# Patient Record
Sex: FEMALE | Race: WHITE | NOT HISPANIC OR LATINO | Employment: UNEMPLOYED | URBAN - METROPOLITAN AREA
[De-identification: names, ages, dates, MRNs, and addresses within clinical notes are randomized per-mention and may not be internally consistent; named-entity substitution may affect disease eponyms.]

---

## 2018-04-06 ENCOUNTER — APPOINTMENT (OUTPATIENT)
Dept: RADIOLOGY | Facility: MEDICAL CENTER | Age: 28
End: 2018-04-06
Attending: EMERGENCY MEDICINE
Payer: COMMERCIAL

## 2018-04-06 ENCOUNTER — HOSPITAL ENCOUNTER (EMERGENCY)
Facility: MEDICAL CENTER | Age: 28
End: 2018-04-06
Attending: EMERGENCY MEDICINE
Payer: COMMERCIAL

## 2018-04-06 VITALS
DIASTOLIC BLOOD PRESSURE: 79 MMHG | WEIGHT: 115.74 LBS | TEMPERATURE: 97.9 F | SYSTOLIC BLOOD PRESSURE: 115 MMHG | BODY MASS INDEX: 21.3 KG/M2 | OXYGEN SATURATION: 100 % | HEIGHT: 62 IN | HEART RATE: 74 BPM | RESPIRATION RATE: 16 BRPM

## 2018-04-06 DIAGNOSIS — O26.899 PELVIC PAIN IN PREGNANCY: ICD-10-CM

## 2018-04-06 DIAGNOSIS — R10.2 PELVIC PAIN IN PREGNANCY: ICD-10-CM

## 2018-04-06 LAB
ALBUMIN SERPL BCP-MCNC: 4.7 G/DL (ref 3.2–4.9)
ALBUMIN/GLOB SERPL: 1.7 G/DL
ALP SERPL-CCNC: 43 U/L (ref 30–99)
ALT SERPL-CCNC: 13 U/L (ref 2–50)
ANION GAP SERPL CALC-SCNC: 9 MMOL/L (ref 0–11.9)
APPEARANCE UR: CLEAR
APPEARANCE UR: CLEAR
AST SERPL-CCNC: 16 U/L (ref 12–45)
B-HCG SERPL-ACNC: 7278.2 MIU/ML (ref 0–5)
BASOPHILS # BLD AUTO: 0.9 % (ref 0–1.8)
BASOPHILS # BLD: 0.09 K/UL (ref 0–0.12)
BILIRUB SERPL-MCNC: 0.4 MG/DL (ref 0.1–1.5)
BUN SERPL-MCNC: 13 MG/DL (ref 8–22)
CALCIUM SERPL-MCNC: 9.8 MG/DL (ref 8.5–10.5)
CHLORIDE SERPL-SCNC: 104 MMOL/L (ref 96–112)
CO2 SERPL-SCNC: 24 MMOL/L (ref 20–33)
COLOR UR AUTO: YELLOW
COLOR UR AUTO: YELLOW
CREAT SERPL-MCNC: 0.93 MG/DL (ref 0.5–1.4)
EOSINOPHIL # BLD AUTO: 0.01 K/UL (ref 0–0.51)
EOSINOPHIL NFR BLD: 0.1 % (ref 0–6.9)
ERYTHROCYTE [DISTWIDTH] IN BLOOD BY AUTOMATED COUNT: 42.2 FL (ref 35.9–50)
GLOBULIN SER CALC-MCNC: 2.8 G/DL (ref 1.9–3.5)
GLUCOSE SERPL-MCNC: 83 MG/DL (ref 65–99)
GLUCOSE UR QL STRIP.AUTO: NEGATIVE MG/DL
GLUCOSE UR STRIP.AUTO-MCNC: NORMAL MG/DL
HCG UR QL: POSITIVE
HCT VFR BLD AUTO: 47.6 % (ref 37–47)
HGB BLD-MCNC: 16.6 G/DL (ref 12–16)
IMM GRANULOCYTES # BLD AUTO: 0.03 K/UL (ref 0–0.11)
IMM GRANULOCYTES NFR BLD AUTO: 0.3 % (ref 0–0.9)
KETONES UR QL STRIP.AUTO: NEGATIVE MG/DL
KETONES UR STRIP.AUTO-MCNC: NORMAL MG/DL
LEUKOCYTE ESTERASE UR QL STRIP.AUTO: NEGATIVE
LEUKOCYTE ESTERASE UR QL STRIP.AUTO: NORMAL
LIPASE SERPL-CCNC: 27 U/L (ref 11–82)
LYMPHOCYTES # BLD AUTO: 2.85 K/UL (ref 1–4.8)
LYMPHOCYTES NFR BLD: 27.6 % (ref 22–41)
MCH RBC QN AUTO: 31.6 PG (ref 27–33)
MCHC RBC AUTO-ENTMCNC: 34.9 G/DL (ref 33.6–35)
MCV RBC AUTO: 90.7 FL (ref 81.4–97.8)
MONOCYTES # BLD AUTO: 0.76 K/UL (ref 0–0.85)
MONOCYTES NFR BLD AUTO: 7.4 % (ref 0–13.4)
NEUTROPHILS # BLD AUTO: 6.6 K/UL (ref 2–7.15)
NEUTROPHILS NFR BLD: 63.7 % (ref 44–72)
NITRITE UR QL STRIP.AUTO: NEGATIVE
NITRITE UR QL STRIP.AUTO: NORMAL
NRBC # BLD AUTO: 0 K/UL
NRBC BLD-RTO: 0 /100 WBC
PH UR STRIP.AUTO: 6 [PH]
PH UR STRIP.AUTO: 6 [PH] (ref 5–8)
PLATELET # BLD AUTO: 222 K/UL (ref 164–446)
PMV BLD AUTO: 9.9 FL (ref 9–12.9)
POTASSIUM SERPL-SCNC: 3.7 MMOL/L (ref 3.6–5.5)
PROT SERPL-MCNC: 7.5 G/DL (ref 6–8.2)
PROT UR QL STRIP: NEGATIVE MG/DL
PROT UR QL STRIP: NORMAL MG/DL
RBC # BLD AUTO: 5.25 M/UL (ref 4.2–5.4)
RBC UR QL AUTO: ABNORMAL
RBC UR QL AUTO: NORMAL
SODIUM SERPL-SCNC: 137 MMOL/L (ref 135–145)
SP GR UR STRIP.AUTO: 1.02
SP GR UR: 1.02
WBC # BLD AUTO: 10.3 K/UL (ref 4.8–10.8)

## 2018-04-06 PROCEDURE — A9270 NON-COVERED ITEM OR SERVICE: HCPCS | Performed by: EMERGENCY MEDICINE

## 2018-04-06 PROCEDURE — 81002 URINALYSIS NONAUTO W/O SCOPE: CPT | Performed by: EMERGENCY MEDICINE

## 2018-04-06 PROCEDURE — 81025 URINE PREGNANCY TEST: CPT

## 2018-04-06 PROCEDURE — 84702 CHORIONIC GONADOTROPIN TEST: CPT

## 2018-04-06 PROCEDURE — 80053 COMPREHEN METABOLIC PANEL: CPT

## 2018-04-06 PROCEDURE — 99284 EMERGENCY DEPT VISIT MOD MDM: CPT

## 2018-04-06 PROCEDURE — 83690 ASSAY OF LIPASE: CPT

## 2018-04-06 PROCEDURE — 81002 URINALYSIS NONAUTO W/O SCOPE: CPT

## 2018-04-06 PROCEDURE — 700102 HCHG RX REV CODE 250 W/ 637 OVERRIDE(OP): Performed by: EMERGENCY MEDICINE

## 2018-04-06 PROCEDURE — 85025 COMPLETE CBC W/AUTO DIFF WBC: CPT

## 2018-04-06 PROCEDURE — 76817 TRANSVAGINAL US OBSTETRIC: CPT

## 2018-04-06 RX ORDER — ACETAMINOPHEN 325 MG/1
650 TABLET ORAL ONCE
Status: COMPLETED | OUTPATIENT
Start: 2018-04-06 | End: 2018-04-06

## 2018-04-06 RX ADMIN — ACETAMINOPHEN 650 MG: 325 TABLET, FILM COATED ORAL at 21:04

## 2018-04-06 ASSESSMENT — PAIN SCALES - GENERAL
PAINLEVEL_OUTOF10: 0
PAINLEVEL_OUTOF10: 5
PAINLEVEL_OUTOF10: 0

## 2018-04-07 NOTE — ED NOTES
D/c instructions reviewed with pt. Pt in nad. resp equal and unlabored. Pt verbalized understanding. Pt ambulatory out of department without difficulty. Pt has ob appt the end of this month.

## 2018-04-07 NOTE — ED PROVIDER NOTES
"ED Provider Note    CHIEF COMPLAINT  Chief Complaint   Patient presents with   • Pregnancy     Pt. states she is approx 5 weeks and 5 days pregnant.   • Cramping     Pt. states generalized abdominal cramping and would like to get checked out to make sure everything is okay with her pregnancy. Pt. denies any vaginal bleeding at this time.       HPI  Osiris Oates is a 27 y.o. female who presents with a report that she's got some generalized abdominal cramping that is lower quadrant in location mostly left side and is concerned about her pregnancy. Denies any vaginal bleeding or discharge but thinks there may be some wrong with her pregnancy. Denies any fever, chills, sweats, dysuria or frequency    REVIEW OF SYSTEMS  See HPI for further details. All other systems are negative.     PAST MEDICAL HISTORY  Past Medical History:   Diagnosis Date   • BELLE positive    • Anxiety    • Endometriosis    • Endometriosis    • Gastroparesis    • Interstitial cystitis    • Seizure disorder (CMS-HCC)        FAMILY HISTORY  Family History   Problem Relation Age of Onset   • Other Mother      Wilsons Disease       SOCIAL HISTORY   reports that she has been smoking Cigarettes.  She has smoked for the past 3.00 years. She has never used smokeless tobacco. She reports that she does not drink alcohol or use drugs.    SURGICAL HISTORY  Past Surgical History:   Procedure Laterality Date   • APPENDECTOMY     • LAPAROSCOPIC DESTRUCTION OF ENDOMETRIOSIS     • OTHER ABDOMINAL SURGERY     • TONSILLECTOMY         CURRENT MEDICATIONS  Home Medications    **Home medications have not yet been reviewed for this encounter**         ALLERGIES  Allergies   Allergen Reactions   • Codeine      Gives nausea   • Tramadol Anaphylaxis   • Vicodin [Hydrocodone-Acetaminophen]        PHYSICAL EXAM  VITAL SIGNS: /75   Pulse 81   Temp 36.5 °C (97.7 °F)   Resp 16   Ht 1.575 m (5' 2\")   Wt 52.5 kg (115 lb 11.9 oz)   LMP 01/04/2018   SpO2 100%   " BMI 21.17 kg/m²    Constitutional: Well developed, Well nourished, No acute distress, Non-toxic appearance.   HENT: Normocephalic, Atraumatic, Bilateral external ears normal, Oropharynx is clear mucous membranes are moist. No oral exudates or nasal discharge.   Eyes: Pupils are equal round and reactive, EOMI, Conjunctiva normal, No discharge.   Neck: Normal range of motion, No tenderness, Supple, No stridor. No meningismus.  Lymphatic: No lymphadenopathy noted.   Cardiovascular: Regular rate and rhythm without murmur rub or gallop.  Thorax & Lungs: Clear breath sounds bilaterally without wheezes, rhonchi or rales. There is no chest wall tenderness.   Abdomen: Soft with left sided pelvic tenderness that is periovarian, the abdomen is scaphoid and otherwise non-distended. There is no rebound or guarding. No organomegaly is appreciated. Bowel sounds are normal.  Skin: Normal without rash.   Back: No CVA or spinal tenderness.   Extremities: Intact distal pulses, No edema, No tenderness, No cyanosis, No clubbing. Capillary refill is less than 2 seconds.  Musculoskeletal: Good range of motion in all major joints. No tenderness to palpation or major deformities noted.   Neurologic: Alert & oriented x 3, Normal motor function, Normal sensory function, No focal deficits noted. Reflexes are normal.  Psychiatric: Affect normal, Judgment normal, Mood normal. There is no suicidal ideation or patient reported hallucinations.     RADIOLOGY/PROCEDURES  US-OB PELVIS TRANSVAGINAL   Final Result         1.  Early intrauterine pregnancy at calculated at 5 weeks 5 days gestation for estimated date of delivery December 2, 2018. Fetal pole is not identified, cannot evaluate for viability.   2.  Solid and cystic-appearing left ovarian masslike structure. Could represent atypical appearing corpus luteal cyst. Complex soft tissue mass not excluded. Recommend follow-up pelvic sonogram in 6 weeks for reevaluation.            COURSE & MEDICAL  DECISION MAKING  Pertinent Labs & Imaging studies reviewed. (See chart for details)  Time had concerns of possible ectopic pregnancy in this young woman who has a 1st pregnancy that is causing her some pain on the left side.    Beta-hCG is 7278 and therefore intrauterine pregnancy should be able to be identified. There is no leukocytosis, shift, anemia or electrolyte derangements on laboratory evaluation    Ultrasound shows evidence of early intrauterine gestation that is almost 6 weeks. She needs follow-up ultrasound in another 6 weeks to determine true viability. She is understanding her need for follow-up and repeat ultrasound and her need for prenatal care. She is discharged with instructions to take Tylenol for discomfort return of any heavy vaginal bleeding that she is concerned about    FINAL IMPRESSION  1. Pelvic pain in pregnancy             Electronically signed by: Eduard Foster, 4/6/2018 10:48 PM

## 2018-04-07 NOTE — ED TRIAGE NOTES
"Osiris Jennifer Иван  27 y.o. female  Chief Complaint   Patient presents with   • Pregnancy     Pt. states she is approx 5 weeks and 5 days pregnant.   • Cramping     Pt. states generalized abdominal cramping and would like to get checked out to make sure everything is okay with her pregnancy. Pt. denies any vaginal bleeding at this time.     /75   Pulse 81   Temp 36.5 °C (97.7 °F)   Resp 16   Ht 1.575 m (5' 2\")   Wt 52.5 kg (115 lb 11.9 oz)   SpO2 100%   BMI 21.17 kg/m²     Pt amb to triage with steady gait for above complaint.   Pt is alert and oriented, speaking in full sentences, follows commands and responds appropriately to questions. NAD. Resp are even and unlabored.  Pt placed in lobby. Pt educated on triage process. Pt encouraged to alert staff for any changes.  "

## 2018-04-07 NOTE — ED NOTES
"Pt c/o pelvic pain and lower abd cramping, pain worse with movement. Pt reports + pregnancy test at home, estimating she is approx 5-6 weeks pregnant. Pt has not seen ob, reports she cannot get appt until the end of the month. Pt denies any vaginal bleeding or discharge. Pt reports this is her first pregnancy. Pt reports \"i just want to get everything checked out\".   "

## 2018-04-07 NOTE — DISCHARGE INSTRUCTIONS
Pelvic Pain, Female  Female pelvic pain can be caused by many different things and start from a variety of places. Pelvic pain refers to pain that is located in the lower half of the abdomen and between your hips. The pain may occur over a short period of time (acute) or may be reoccurring (chronic). The cause of pelvic pain may be related to disorders affecting the female reproductive organs (gynecologic), but it may also be related to the bladder, kidney stones, an intestinal complication, or muscle or skeletal problems. Getting help right away for pelvic pain is important, especially if there has been severe, sharp, or a sudden onset of unusual pain. It is also important to get help right away because some types of pelvic pain can be life threatening.   CAUSES   Below are only some of the causes of pelvic pain. The causes of pelvic pain can be in one of several categories.   · Gynecologic.  ¨ Pelvic inflammatory disease.  ¨ Sexually transmitted infection.  ¨ Ovarian cyst or a twisted ovarian ligament (ovarian torsion).  ¨ Uterine lining that grows outside the uterus (endometriosis).  ¨ Fibroids, cysts, or tumors.  ¨ Ovulation.  · Pregnancy.  ¨ Pregnancy that occurs outside the uterus (ectopic pregnancy).  ¨ Miscarriage.  ¨ Labor.  ¨ Abruption of the placenta or ruptured uterus.  · Infection.  ¨ Uterine infection (endometritis).  ¨ Bladder infection.  ¨ Diverticulitis.  ¨ Miscarriage related to a uterine infection (septic ).  · Bladder.  ¨ Inflammation of the bladder (cystitis).  ¨ Kidney stone(s).  · Gastrointestinal.  ¨ Constipation.  ¨ Diverticulitis.  · Neurologic.  ¨ Trauma.  ¨ Feeling pelvic pain because of mental or emotional causes (psychosomatic).  · Cancers of the bowel or pelvis.  EVALUATION   Your caregiver will want to take a careful history of your concerns. This includes recent changes in your health, a careful gynecologic history of your periods (menses), and a sexual history. Obtaining  your family history and medical history is also important. Your caregiver may suggest a pelvic exam. A pelvic exam will help identify the location and severity of the pain. It also helps in the evaluation of which organ system may be involved. In order to identify the cause of the pelvic pain and be properly treated, your caregiver may order tests. These tests may include:   · A pregnancy test.  · Pelvic ultrasonography.  · An X-ray exam of the abdomen.  · A urinalysis or evaluation of vaginal discharge.  · Blood tests.  HOME CARE INSTRUCTIONS   · Only take over-the-counter or prescription medicines for pain, discomfort, or fever as directed by your caregiver.    · Rest as directed by your caregiver.    · Eat a balanced diet.    · Drink enough fluids to make your urine clear or pale yellow, or as directed.    · Avoid sexual intercourse if it causes pain.    · Apply warm or cold compresses to the lower abdomen depending on which one helps the pain.    · Avoid stressful situations.    · Keep a journal of your pelvic pain. Write down when it started, where the pain is located, and if there are things that seem to be associated with the pain, such as food or your menstrual cycle.  · Follow up with your caregiver as directed.    SEEK MEDICAL CARE IF:  · Your medicine does not help your pain.  · You have abnormal vaginal discharge.  SEEK IMMEDIATE MEDICAL CARE IF:   · You have heavy bleeding from the vagina.    · Your pelvic pain increases.    · You feel light-headed or faint.    · You have chills.    · You have pain with urination or blood in your urine.    · You have uncontrolled diarrhea or vomiting.    · You have a fever or persistent symptoms for more than 3 days.  · You have a fever and your symptoms suddenly get worse.    · You are being physically or sexually abused.  This information is not intended to replace advice given to you by your health care provider. Make sure you discuss any questions you have with your  health care provider.  Document Released: 11/14/2005 Document Revised: 09/07/2016 Document Reviewed: 10/07/2016  Elsevier Interactive Patient Education © 2017 [employer]

## 2018-04-07 NOTE — ED NOTES
Pt updated with plan of care, awaiting us and lab results. Pt verbalized understanding. Pt laughing in bed with boyfriend, pt in nad. Will continue to monitor.

## 2018-04-29 ENCOUNTER — HOSPITAL ENCOUNTER (EMERGENCY)
Facility: MEDICAL CENTER | Age: 28
End: 2018-04-29
Attending: EMERGENCY MEDICINE
Payer: COMMERCIAL

## 2018-04-29 VITALS
SYSTOLIC BLOOD PRESSURE: 103 MMHG | RESPIRATION RATE: 18 BRPM | HEIGHT: 62 IN | BODY MASS INDEX: 21.58 KG/M2 | HEART RATE: 90 BPM | OXYGEN SATURATION: 100 % | WEIGHT: 117.28 LBS | DIASTOLIC BLOOD PRESSURE: 69 MMHG | TEMPERATURE: 97.2 F

## 2018-04-29 DIAGNOSIS — A08.4 VIRAL GASTROENTERITIS: ICD-10-CM

## 2018-04-29 PROCEDURE — 99284 EMERGENCY DEPT VISIT MOD MDM: CPT

## 2018-04-29 RX ORDER — ONDANSETRON 4 MG/1
4 TABLET, ORALLY DISINTEGRATING ORAL EVERY 6 HOURS PRN
Qty: 10 TAB | Refills: 0 | Status: SHIPPED | OUTPATIENT
Start: 2018-04-29 | End: 2018-09-30

## 2018-04-29 ASSESSMENT — PAIN SCALES - GENERAL: PAINLEVEL_OUTOF10: 7

## 2018-04-29 ASSESSMENT — LIFESTYLE VARIABLES: DO YOU DRINK ALCOHOL: NO

## 2018-04-29 NOTE — ED NOTES
Pt given discharge instructions and prescriptions.  All questions answered.  Pt discharged home with s/o.

## 2018-04-29 NOTE — ED TRIAGE NOTES
".  Chief Complaint   Patient presents with   • Diarrhea     Dark red diarrhea with mucus since yesterday   • N/V     x 1 episode of vomiting last night, denies blood in vomit     ./72   Pulse 92   Temp 36.2 °C (97.2 °F)   Resp 18   Ht 1.575 m (5' 2\")   Wt 53.2 kg (117 lb 4.6 oz)   LMP 01/04/2018   SpO2 100%   BMI 21.45 kg/m²     Ambulatory to triage with above complaints, patient is 9 weeks pregnant, G1, educated on triage process, placed in lobby with significant other, told to inform staff of any changes in condition.    "

## 2018-04-29 NOTE — DISCHARGE INSTRUCTIONS
Food Choices to Help Relieve Diarrhea, Adult  When you have diarrhea, the foods you eat and your eating habits are very important. Choosing the right foods and drinks can help relieve diarrhea. Also, because diarrhea can last up to 7 days, you need to replace lost fluids and electrolytes (such as sodium, potassium, and chloride) in order to help prevent dehydration.   WHAT GENERAL GUIDELINES DO I NEED TO FOLLOW?  · Slowly drink 1 cup (8 oz) of fluid for each episode of diarrhea. If you are getting enough fluid, your urine will be clear or pale yellow.  · Eat starchy foods. Some good choices include white rice, white toast, pasta, low-fiber cereal, baked potatoes (without the skin), saltine crackers, and bagels.  · Avoid large servings of any cooked vegetables.  · Limit fruit to two servings per day. A serving is ½ cup or 1 small piece.  · Choose foods with less than 2 g of fiber per serving.  · Limit fats to less than 8 tsp (38 g) per day.  · Avoid fried foods.  · Eat foods that have probiotics in them. Probiotics can be found in certain dairy products.  · Avoid foods and beverages that may increase the speed at which food moves through the stomach and intestines (gastrointestinal tract). Things to avoid include:  ¨ High-fiber foods, such as dried fruit, raw fruits and vegetables, nuts, seeds, and whole grain foods.  ¨ Spicy foods and high-fat foods.  ¨ Foods and beverages sweetened with high-fructose corn syrup, honey, or sugar alcohols such as xylitol, sorbitol, and mannitol.  WHAT FOODS ARE RECOMMENDED?  Grains  White rice. White, Indian, or myah breads (fresh or toasted), including plain rolls, buns, or bagels. White pasta. Saltine, soda, or alli crackers. Pretzels. Low-fiber cereal. Cooked cereals made with water (such as cornmeal, farina, or cream cereals). Plain muffins. Matzo. Velma toast. Zwieback.   Vegetables  Potatoes (without the skin). Strained tomato and vegetable juices. Most well-cooked and canned  vegetables without seeds. Tender lettuce.  Fruits  Cooked or canned applesauce, apricots, cherries, fruit cocktail, grapefruit, peaches, pears, or plums. Fresh bananas, apples without skin, cherries, grapes, cantaloupe, grapefruit, peaches, oranges, or plums.   Meat and Other Protein Products  Baked or boiled chicken. Eggs. Tofu. Fish. Seafood. Smooth peanut butter. Ground or well-cooked tender beef, ham, veal, lamb, pork, or poultry.   Dairy  Plain yogurt, kefir, and unsweetened liquid yogurt. Lactose-free milk, buttermilk, or soy milk. Plain hard cheese.  Beverages  Sport drinks. Clear broths. Diluted fruit juices (except prune). Regular, caffeine-free sodas such as ginger ale. Water. Decaffeinated teas. Oral rehydration solutions. Sugar-free beverages not sweetened with sugar alcohols.  Other  Bouillon, broth, or soups made from recommended foods.   The items listed above may not be a complete list of recommended foods or beverages. Contact your dietitian for more options.  WHAT FOODS ARE NOT RECOMMENDED?  Grains  Whole grain, whole wheat, bran, or rye breads, rolls, pastas, crackers, and cereals. Wild or brown rice. Cereals that contain more than 2 g of fiber per serving. Corn tortillas or taco shells. Cooked or dry oatmeal. Granola. Popcorn.  Vegetables  Raw vegetables. Cabbage, broccoli, [place] sprouts, artichokes, baked beans, beet greens, corn, kale, legumes, peas, sweet potatoes, and yams. Potato skins. Cooked spinach and cabbage.  Fruits  Dried fruit, including raisins and dates. Raw fruits. Stewed or dried prunes. Fresh apples with skin, apricots, mangoes, pears, raspberries, and strawberries.   Meat and Other Protein Products  [place] peanut butter. Nuts and seeds. Beans and lentils. [place]ickey.   Dairy  High-fat cheeses. Milk, chocolate milk, and beverages made with milk, such as milk shakes. Cream. Ice cream.  Sweets and Desserts  Sweet rolls, doughnuts, and sweet breads. Pancakes and waffles.  Fats and  Oils  Butter. Cream sauces. Margarine. Salad oils. Plain salad dressings. Olives. Avocados.   Beverages  Caffeinated beverages (such as coffee, tea, soda, or energy drinks). Alcoholic beverages. Fruit juices with pulp. Prune juice. Soft drinks sweetened with high-fructose corn syrup or sugar alcohols.  Other  Coconut. Hot sauce. Chili powder. Mayonnaise. Gravy. Cream-based or milk-based soups.   The items listed above may not be a complete list of foods and beverages to avoid. Contact your dietitian for more information.  WHAT SHOULD I DO IF I BECOME DEHYDRATED?  Diarrhea can sometimes lead to dehydration. Signs of dehydration include dark urine and dry mouth and skin. If you think you are dehydrated, you should rehydrate with an oral rehydration solution. These solutions can be purchased at pharmacies, retail stores, or online.   Drink ½-1 cup (120-240 mL) of oral rehydration solution each time you have an episode of diarrhea. If drinking this amount makes your diarrhea worse, try drinking smaller amounts more often. For example, drink 1-3 tsp (5-15 mL) every 5-10 minutes.   A general rule for staying hydrated is to drink 1½-2 L of fluid per day. Talk to your health care provider about the specific amount you should be drinking each day. Drink enough fluids to keep your urine clear or pale yellow.     This information is not intended to replace advice given to you by your health care provider. Make sure you discuss any questions you have with your health care provider.     Document Released: 03/09/2005 Document Revised: 01/08/2016 Document Reviewed: 11/10/2014  [employer] Interactive Patient Education ©2016 [employer]

## 2018-04-29 NOTE — ED PROVIDER NOTES
"ED Provider Note      CHIEF COMPLAINT  Chief Complaint   Patient presents with   • Diarrhea     Dark red diarrhea with mucus since yesterday   • N/V     x 1 episode of vomiting last night, denies blood in vomit       HPI  Osiris Oates is a 27 y.o. female who presents with diarrhea. The patient states she has been sick since yesterday. She says she's had diarrhea with some blood in the diarrhea. She's also had some nausea and one episode of emesis last night. Patient states she is approximately 9 weeks pregnant. She has not had any recent vaginal bleeding or discharge. She denies difficulties with urination. The patient has not had any foreign travel nor she been on any recent antibiotics. She is unaware of any sick contacts.    REVIEW OF SYSTEMS  See HPI for further details. All other systems are negative.     PAST MEDICAL HISTORY  Past Medical History:   Diagnosis Date   • BELLE positive    • Anxiety    • Endometriosis    • Endometriosis    • Gastroparesis    • Interstitial cystitis    • Seizure disorder (HCC)        SOCIAL HISTORY  Social History     Social History   • Marital status: Single     Spouse name: N/A   • Number of children: N/A   • Years of education: N/A     Social History Main Topics   • Smoking status: Current Every Day Smoker     Years: 3.00     Types: Cigarettes     Last attempt to quit: 8/29/2014   • Smokeless tobacco: Never Used   • Alcohol use No   • Drug use: No   • Sexual activity: Not on file     Other Topics Concern   • Not on file     Social History Narrative   • No narrative on file           PHYSICAL EXAM  VITAL SIGNS: /72   Pulse 92   Temp 36.2 °C (97.2 °F)   Resp 18   Ht 1.575 m (5' 2\")   Wt 53.2 kg (117 lb 4.6 oz)   LMP 01/04/2018   SpO2 100%   BMI 21.45 kg/m²   Constitutional: Well developed, Well nourished, No acute distress, Non-toxic appearance.   HENT: Normocephalic, Atraumatic, tympanic membranes are intact and nonerythematous bilaterally, Oropharynx moist " without exudates or erythema, Nose normal.   Eyes: PERRLA, EOMI, Conjunctiva normal.  Neck: Supple without meningismus  Lymphatic: No lymphadenopathy noted.   Cardiovascular: Normal heart rate, Normal rhythm, No murmurs, No rubs, No gallops.   Thorax & Lungs: Normal breath sounds, No respiratory distress, No wheezing, No chest tenderness.   Abdomen: Bowel sounds normal, Soft, No tenderness, no rebound, no guarding, no distention, No masses, No pulsatile masses.   Skin: Warm, Dry, No erythema, No rash.   Back: No tenderness, No CVA tenderness.   Extremities: Atraumatic with symmetric distal pulses, No edema, No tenderness, No cyanosis, No clubbing.   Neurologic: Alert & oriented x 3, cranial nerves II through XII are intact, Normal motor function, Normal sensory function, No focal deficits noted.   Psychiatric: Affect normal, Judgment normal, Mood normal.     ECOURSE & MEDICAL DECISION MAKING  Pertinent Labs & Imaging studies reviewed. (See chart for details)  This a 27-year-old female who presents with signs and symptoms consistent with a viral gastroenteritis. She does not appear toxic and her orthostatic blood pressures do not support significant volume depletion. Therefore the patient be treated supportively with Zofran and instructions to drink lots of fluids. She will start a bananas, rice, apples, and toast diet. The patient will return for increased abdominal pain or persistent vomiting.    FINAL IMPRESSION  1. Viral gastroenteritis       Disposition  The patient will be discharged in stable condition    Electronically signed by: Alan Hudson, 4/29/2018 11:02 AM

## 2018-05-26 ENCOUNTER — APPOINTMENT (OUTPATIENT)
Dept: RADIOLOGY | Facility: MEDICAL CENTER | Age: 28
End: 2018-05-26
Attending: EMERGENCY MEDICINE
Payer: COMMERCIAL

## 2018-05-26 ENCOUNTER — HOSPITAL ENCOUNTER (EMERGENCY)
Facility: MEDICAL CENTER | Age: 28
End: 2018-05-26
Attending: EMERGENCY MEDICINE
Payer: COMMERCIAL

## 2018-05-26 VITALS
WEIGHT: 122.8 LBS | RESPIRATION RATE: 18 BRPM | HEART RATE: 98 BPM | DIASTOLIC BLOOD PRESSURE: 60 MMHG | SYSTOLIC BLOOD PRESSURE: 105 MMHG | TEMPERATURE: 97.2 F | BODY MASS INDEX: 22.46 KG/M2 | OXYGEN SATURATION: 98 %

## 2018-05-26 DIAGNOSIS — R10.32 LEFT LOWER QUADRANT PAIN: ICD-10-CM

## 2018-05-26 DIAGNOSIS — N30.00 ACUTE CYSTITIS WITHOUT HEMATURIA: ICD-10-CM

## 2018-05-26 LAB
ANION GAP SERPL CALC-SCNC: 9 MMOL/L (ref 0–11.9)
APPEARANCE UR: CLEAR
B-HCG SERPL-ACNC: ABNORMAL MIU/ML (ref 0–5)
BACTERIA #/AREA URNS HPF: ABNORMAL /HPF
BASOPHILS # BLD AUTO: 0.3 % (ref 0–1.8)
BASOPHILS # BLD: 0.03 K/UL (ref 0–0.12)
BILIRUB UR QL STRIP.AUTO: NEGATIVE
BUN SERPL-MCNC: 10 MG/DL (ref 8–22)
CALCIUM SERPL-MCNC: 9.2 MG/DL (ref 8.5–10.5)
CHLORIDE SERPL-SCNC: 105 MMOL/L (ref 96–112)
CO2 SERPL-SCNC: 20 MMOL/L (ref 20–33)
COLOR UR: YELLOW
CREAT SERPL-MCNC: 0.6 MG/DL (ref 0.5–1.4)
EOSINOPHIL # BLD AUTO: 0 K/UL (ref 0–0.51)
EOSINOPHIL NFR BLD: 0 % (ref 0–6.9)
EPI CELLS #/AREA URNS HPF: ABNORMAL /HPF
ERYTHROCYTE [DISTWIDTH] IN BLOOD BY AUTOMATED COUNT: 42.8 FL (ref 35.9–50)
GLUCOSE SERPL-MCNC: 83 MG/DL (ref 65–99)
GLUCOSE UR STRIP.AUTO-MCNC: NEGATIVE MG/DL
HCT VFR BLD AUTO: 39.6 % (ref 37–47)
HGB BLD-MCNC: 13.7 G/DL (ref 12–16)
IMM GRANULOCYTES # BLD AUTO: 0.03 K/UL (ref 0–0.11)
IMM GRANULOCYTES NFR BLD AUTO: 0.3 % (ref 0–0.9)
KETONES UR STRIP.AUTO-MCNC: NEGATIVE MG/DL
LEUKOCYTE ESTERASE UR QL STRIP.AUTO: ABNORMAL
LYMPHOCYTES # BLD AUTO: 2.35 K/UL (ref 1–4.8)
LYMPHOCYTES NFR BLD: 21.4 % (ref 22–41)
MCH RBC QN AUTO: 31.9 PG (ref 27–33)
MCHC RBC AUTO-ENTMCNC: 34.6 G/DL (ref 33.6–35)
MCV RBC AUTO: 92.3 FL (ref 81.4–97.8)
MICRO URNS: ABNORMAL
MONOCYTES # BLD AUTO: 0.68 K/UL (ref 0–0.85)
MONOCYTES NFR BLD AUTO: 6.2 % (ref 0–13.4)
NEUTROPHILS # BLD AUTO: 7.89 K/UL (ref 2–7.15)
NEUTROPHILS NFR BLD: 71.8 % (ref 44–72)
NITRITE UR QL STRIP.AUTO: NEGATIVE
NRBC # BLD AUTO: 0 K/UL
NRBC BLD-RTO: 0 /100 WBC
NUMBER OF RH DOSES IND 8505RD: NORMAL
PH UR STRIP.AUTO: 7.5 [PH]
PLATELET # BLD AUTO: 214 K/UL (ref 164–446)
PMV BLD AUTO: 9.2 FL (ref 9–12.9)
POTASSIUM SERPL-SCNC: 3.4 MMOL/L (ref 3.6–5.5)
PROT UR QL STRIP: NEGATIVE MG/DL
RBC # BLD AUTO: 4.29 M/UL (ref 4.2–5.4)
RBC # URNS HPF: ABNORMAL /HPF
RBC UR QL AUTO: NEGATIVE
RH BLD: NORMAL
SODIUM SERPL-SCNC: 134 MMOL/L (ref 135–145)
SP GR UR STRIP.AUTO: 1
UROBILINOGEN UR STRIP.AUTO-MCNC: 0.2 MG/DL
WBC # BLD AUTO: 11 K/UL (ref 4.8–10.8)
WBC #/AREA URNS HPF: ABNORMAL /HPF

## 2018-05-26 PROCEDURE — 36415 COLL VENOUS BLD VENIPUNCTURE: CPT

## 2018-05-26 PROCEDURE — 85025 COMPLETE CBC W/AUTO DIFF WBC: CPT

## 2018-05-26 PROCEDURE — A9270 NON-COVERED ITEM OR SERVICE: HCPCS | Performed by: EMERGENCY MEDICINE

## 2018-05-26 PROCEDURE — 81001 URINALYSIS AUTO W/SCOPE: CPT

## 2018-05-26 PROCEDURE — 80048 BASIC METABOLIC PNL TOTAL CA: CPT

## 2018-05-26 PROCEDURE — 99284 EMERGENCY DEPT VISIT MOD MDM: CPT

## 2018-05-26 PROCEDURE — 84702 CHORIONIC GONADOTROPIN TEST: CPT

## 2018-05-26 PROCEDURE — 700102 HCHG RX REV CODE 250 W/ 637 OVERRIDE(OP): Performed by: EMERGENCY MEDICINE

## 2018-05-26 PROCEDURE — 76817 TRANSVAGINAL US OBSTETRIC: CPT

## 2018-05-26 PROCEDURE — 86901 BLOOD TYPING SEROLOGIC RH(D): CPT

## 2018-05-26 RX ORDER — NITROFURANTOIN 25; 75 MG/1; MG/1
100 CAPSULE ORAL 2 TIMES DAILY
Qty: 14 CAP | Refills: 0 | Status: SHIPPED | OUTPATIENT
Start: 2018-05-26 | End: 2018-06-02

## 2018-05-26 RX ORDER — ACETAMINOPHEN 325 MG/1
650 TABLET ORAL ONCE
Status: COMPLETED | OUTPATIENT
Start: 2018-05-26 | End: 2018-05-26

## 2018-05-26 RX ORDER — NITROFURANTOIN 25; 75 MG/1; MG/1
100 CAPSULE ORAL ONCE
Status: COMPLETED | OUTPATIENT
Start: 2018-05-26 | End: 2018-05-26

## 2018-05-26 RX ADMIN — ACETAMINOPHEN 650 MG: 325 TABLET, FILM COATED ORAL at 20:52

## 2018-05-26 RX ADMIN — NITROFURANTOIN (MONOHYDRATE/MACROCRYSTALS) 100 MG: 75; 25 CAPSULE ORAL at 22:49

## 2018-05-26 ASSESSMENT — LIFESTYLE VARIABLES: DO YOU DRINK ALCOHOL: NO

## 2018-05-26 ASSESSMENT — PAIN SCALES - GENERAL: PAINLEVEL_OUTOF10: 4

## 2018-05-27 NOTE — ED TRIAGE NOTES
Pt. Ambulatory to [facility] from triage with steady gait. Pt. States she is 12 weeks pregnant and is experience left sided flank/rib/side pain that is exacerbated with movement. Pt. Denies any N/V/D or constitaption. Pt. Denies any rectal or vaginal bleeding. Pt. Updated on POC for ERP to see, given warm blankets, and call light. Will continue to monitor.

## 2018-05-27 NOTE — ED NOTES
IV started. Labs and urine sent to lab. Call light within reach. Pt. Updated on POC for US. Will continue to monitor.

## 2018-05-27 NOTE — ED TRIAGE NOTES
"Pt to triage c/o lt sided abd pain / rib pain , pt states \" she is 12 weeks pregnant\" , denies any vaginal bleeding\" , denies any trauma   "

## 2018-05-27 NOTE — DISCHARGE INSTRUCTIONS
Abdominal Pain During Pregnancy  Belly (abdominal) pain is common during pregnancy. Most of the time, it is not a serious problem. Other times, it can be a sign that something is wrong with the pregnancy. Always tell your doctor if you have belly pain.  Follow these instructions at home:  Monitor your belly pain for any changes. The following actions may help you feel better:  · Do not have sex (intercourse) or put anything in your vagina until you feel better.  · Rest until your pain stops.  · Drink clear fluids if you feel sick to your stomach (nauseous). Do not eat solid food until you feel better.  · Only take medicine as told by your doctor.  · Keep all doctor visits as told.  Get help right away if:  · You are bleeding, leaking fluid, or pieces of tissue come out of your vagina.  · You have more pain or cramping.  · You keep throwing up (vomiting).  · You have pain when you pee (urinate) or have blood in your pee.  · You have a fever.  · You do not feel your baby moving as much.  · You feel very weak or feel like passing out.  · You have trouble breathing, with or without belly pain.  · You have a very bad headache and belly pain.  · You have fluid leaking from your vagina and belly pain.  · You keep having watery poop (diarrhea).  · Your belly pain does not go away after resting, or the pain gets worse.  This information is not intended to replace advice given to you by your health care provider. Make sure you discuss any questions you have with your health care provider.  Document Released: 12/06/2010 Document Revised: 07/26/2017 Document Reviewed: 07/17/2014  [employer] Interactive Patient Education © 2017 [employer]

## 2018-05-27 NOTE — ED NOTES
Discharge instructions given to patient, prescriptions provided, a verbal understanding of all instructions was stated. IV removed, cathlon intact, site without s/s of infection. Pt preferred to walk out accompanied by boyfriend. VSS,  all belongings accounted for.

## 2018-05-27 NOTE — ED PROVIDER NOTES
ED Provider Note      Primary care provider: Jono Carbajal M.D.    CHIEF COMPLAINT  Chief Complaint   Patient presents with   • Abdominal Pain       HPI  Osiris Oates is a 27 y.o. female who presents to the Emergency Department with chief complaint of abdominal pain.  Left lower quadrant abdominal pain approximately 12 weeks pregnant.  He is established with gynecology/obstetrics for this pregnancy.  She has had no abnormal vaginal bleeding abnormal vaginal discharge pain is rated as 5 out of 10 crampy in nature states it feels like gas pains except more severe.  No dysuria no constipation no diarrhea no melena no hematochezia no hematuria.  Minor nausea without emesis no measured fevers or chills    REVIEW OF SYSTEMS  10 systems reviewed and otherwise negative, pertinent positives and negatives listed in the history of present illness.    PAST MEDICAL HISTORY   has a past medical history of BELLE positive; Anxiety; Endometriosis; Endometriosis; Gastroparesis; Interstitial cystitis; and Seizure disorder (HCC).    SURGICAL HISTORY   has a past surgical history that includes tonsillectomy; laparoscopic destruction of endometriosis; other abdominal surgery; and appendectomy.    SOCIAL HISTORY  Social History   Substance Use Topics   • Smoking status: Current Every Day Smoker     Years: 3.00     Types: Cigarettes     Last attempt to quit: 8/29/2014   • Smokeless tobacco: Never Used   • Alcohol use No      History   Drug Use No       FAMILY HISTORY  Non-Contributory    CURRENT MEDICATIONS  Home Medications     Reviewed by Kailee Angeles R.N. (Registered Nurse) on 05/26/18 at 1955  Med List Status: Partial   Medication Last Dose Status   nabumetone (RELAFEN) 500 MG Tab 1/8/2018 Active   ondansetron (ZOFRAN ODT) 4 MG TABLET DISPERSIBLE  Active                ALLERGIES  Allergies   Allergen Reactions   • Codeine      Gives nausea   • Tramadol Anaphylaxis   • Vicodin [Hydrocodone-Acetaminophen]         PHYSICAL EXAM  VITAL SIGNS: /60   Pulse 100   Temp 36.2 °C (97.2 °F)   Resp 17   Wt 55.7 kg (122 lb 12.7 oz)   LMP 01/04/2018   SpO2 100%   BMI 22.46 kg/m²   Pulse ox interpretation: I interpret this pulse ox as normal.  Constitutional: Alert and oriented x 3, minimal distress  HEENT: Atraumatic normocephalic, pupils are equal round reactive to light extraocular movements are intact. The nares is clear, external ears are normal, mouth shows moist mucous membranes  Neck: Supple, no JVD no tracheal deviation  Cardiovascular: Regular rate and rhythm no murmur rub or gallop 2+ pulses peripherally x4  Thorax & Lungs: No respiratory distress, no wheezes rales or rhonchi, No chest tenderness.   GI: Very slight tenderness in left lower quadrant no rebound no guarding no other focal tenderness no peritoneal signs positive bowel sounds nondistended  Skin: Warm dry no acute rash or lesion  Musculoskeletal: Moving all extremities with full range and 5 of 5 strength, no acute  deformity  Neurologic: Cranial nerves III through XII are grossly intact, no sensory deficit, no cerebellar dysfunction   Psychiatric: Appropriate affect for situation at this time      DIAGNOSTIC STUDIES / PROCEDURES  LABS      Results for orders placed or performed during the hospital encounter of 05/26/18   CBC WITH DIFFERENTIAL   Result Value Ref Range    WBC 11.0 (H) 4.8 - 10.8 K/uL    RBC 4.29 4.20 - 5.40 M/uL    Hemoglobin 13.7 12.0 - 16.0 g/dL    Hematocrit 39.6 37.0 - 47.0 %    MCV 92.3 81.4 - 97.8 fL    MCH 31.9 27.0 - 33.0 pg    MCHC 34.6 33.6 - 35.0 g/dL    RDW 42.8 35.9 - 50.0 fL    Platelet Count 214 164 - 446 K/uL    MPV 9.2 9.0 - 12.9 fL    Neutrophils-Polys 71.80 44.00 - 72.00 %    Lymphocytes 21.40 (L) 22.00 - 41.00 %    Monocytes 6.20 0.00 - 13.40 %    Eosinophils 0.00 0.00 - 6.90 %    Basophils 0.30 0.00 - 1.80 %    Immature Granulocytes 0.30 0.00 - 0.90 %    Nucleated RBC 0.00 /100 WBC    Neutrophils (Absolute) 7.89  (H) 2.00 - 7.15 K/uL    Lymphs (Absolute) 2.35 1.00 - 4.80 K/uL    Monos (Absolute) 0.68 0.00 - 0.85 K/uL    Eos (Absolute) 0.00 0.00 - 0.51 K/uL    Baso (Absolute) 0.03 0.00 - 0.12 K/uL    Immature Granulocytes (abs) 0.03 0.00 - 0.11 K/uL    NRBC (Absolute) 0.00 K/uL   BASIC METABOLIC PANEL   Result Value Ref Range    Sodium 134 (L) 135 - 145 mmol/L    Potassium 3.4 (L) 3.6 - 5.5 mmol/L    Chloride 105 96 - 112 mmol/L    Co2 20 20 - 33 mmol/L    Glucose 83 65 - 99 mg/dL    Bun 10 8 - 22 mg/dL    Creatinine 0.60 0.50 - 1.40 mg/dL    Calcium 9.2 8.5 - 10.5 mg/dL    Anion Gap 9.0 0.0 - 11.9   URINALYSIS CULTURE, IF INDICATED   Result Value Ref Range    Color Yellow     Character Clear     Specific Gravity 1.004 <1.035    Ph 7.5 5.0 - 8.0    Glucose Negative Negative mg/dL    Ketones Negative Negative mg/dL    Protein Negative Negative mg/dL    Bilirubin Negative Negative    Urobilinogen, Urine 0.2 Negative    Nitrite Negative Negative    Leukocyte Esterase Trace (A) Negative    Occult Blood Negative Negative    Micro Urine Req Microscopic    RH TYPE FOR RHOGAM FROM E.D.   Result Value Ref Range    Emergency Department Rh Typing POS     Number Of Rh Doses Indicated ZERO    HCG QUANTITATIVE SERUM   Result Value Ref Range    Bhcg 45303.9 (H) 0.0 - 5.0 mIU/mL   ESTIMATED GFR   Result Value Ref Range    GFR If African American >60 >60 mL/min/1.73 m 2    GFR If Non African American >60 >60 mL/min/1.73 m 2   URINE MICROSCOPIC (W/UA)   Result Value Ref Range    WBC 5-10 (A) /hpf    RBC 0-2 /hpf    Bacteria Moderate (A) None /hpf    Epithelial Cells Few /hpf       All labs reviewed by me.      COURSE & MEDICAL DECISION MAKING  Pertinent Labs & Imaging studies reviewed. (See chart for details)    7:59 PM - Patient seen and examined at bedside.         Patient noted to have slightly elevated blood pressure likely circumstantial secondary to presenting complaint. Referred to primary care physician for further  evaluation.      Medical Decision Making: Urinalysis suggestive of slight urinary tract infection.  Given dose of Macrobid here and discharged with the same.  Nonvisualization of the left ovary were no abnormal process visualized.  Patient is feeling completely better after 1 dose of Tylenol.  Other labs as above.  Ultrasound demonstrates live IUP at 13 weeks.  Patient given instructions to follow-up with her obstetrician return for worsening pain abnormal bleeding discharge any other acute symptoms or concerns otherwise discharged in stable and improved condition.    /60   Pulse 98   Temp 36.2 °C (97.2 °F)   Resp 18   Wt 55.7 kg (122 lb 12.7 oz)   LMP 01/04/2018   SpO2 98%   BMI 22.46 kg/m²     FINAL IMPRESSION  1. Left lower quadrant pain    2. Acute cystitis without hematuria          This dictation has been created using voice recognition software and/or scribes. The accuracy of the dictation is limited by the abilities of the software and the expertise of the scribes. I expect there may be some errors of grammar and possibly content. I made every attempt to manually correct the errors within my dictation. However, errors related to voice recognition software and/or scribes may still exist and should be interpreted within the appropriate context.          ED Provider Note

## 2018-05-31 LAB
HBV SURFACE AG SERPL QL IA: NEGATIVE
RUBV IGM SER IA-ACNC: NORMAL

## 2018-07-14 ENCOUNTER — HOSPITAL ENCOUNTER (EMERGENCY)
Facility: MEDICAL CENTER | Age: 28
End: 2018-07-15
Attending: EMERGENCY MEDICINE
Payer: COMMERCIAL

## 2018-07-14 DIAGNOSIS — R10.9 ABDOMINAL PAIN DURING PREGNANCY IN SECOND TRIMESTER: ICD-10-CM

## 2018-07-14 DIAGNOSIS — N89.8 VAGINAL DISCHARGE: ICD-10-CM

## 2018-07-14 DIAGNOSIS — O26.892 ABDOMINAL PAIN DURING PREGNANCY IN SECOND TRIMESTER: ICD-10-CM

## 2018-07-14 PROCEDURE — 99284 EMERGENCY DEPT VISIT MOD MDM: CPT

## 2018-07-14 ASSESSMENT — PAIN SCALES - GENERAL: PAINLEVEL_OUTOF10: 7

## 2018-07-15 VITALS
BODY MASS INDEX: 23.19 KG/M2 | OXYGEN SATURATION: 98 % | DIASTOLIC BLOOD PRESSURE: 60 MMHG | SYSTOLIC BLOOD PRESSURE: 94 MMHG | WEIGHT: 126 LBS | TEMPERATURE: 97.8 F | RESPIRATION RATE: 16 BRPM | HEART RATE: 88 BPM | HEIGHT: 62 IN

## 2018-07-15 LAB
APPEARANCE UR: CLEAR
BACTERIA GENITAL QL WET PREP: NORMAL
BILIRUB UR QL STRIP.AUTO: NEGATIVE
COLOR UR: YELLOW
GLUCOSE UR STRIP.AUTO-MCNC: NEGATIVE MG/DL
KETONES UR STRIP.AUTO-MCNC: NEGATIVE MG/DL
LEUKOCYTE ESTERASE UR QL STRIP.AUTO: NEGATIVE
MICRO URNS: NORMAL
NITRITE UR QL STRIP.AUTO: NEGATIVE
PH UR STRIP.AUTO: 6 [PH]
PROT UR QL STRIP: NEGATIVE MG/DL
RBC UR QL AUTO: NEGATIVE
SIGNIFICANT IND 70042: NORMAL
SITE SITE: NORMAL
SOURCE SOURCE: NORMAL
SP GR UR STRIP.AUTO: 1.01
UROBILINOGEN UR STRIP.AUTO-MCNC: 0.2 MG/DL

## 2018-07-15 PROCEDURE — 87491 CHLMYD TRACH DNA AMP PROBE: CPT

## 2018-07-15 PROCEDURE — 87591 N.GONORRHOEAE DNA AMP PROB: CPT

## 2018-07-15 PROCEDURE — 81003 URINALYSIS AUTO W/O SCOPE: CPT

## 2018-07-15 ASSESSMENT — PAIN SCALES - GENERAL
PAINLEVEL_OUTOF10: 6
PAINLEVEL_OUTOF10: 6
PAINLEVEL_OUTOF10: 7

## 2018-07-15 NOTE — ED PROVIDER NOTES
ED Provider Note    Scribed for Shilpi Jackson M.D. by Joel Allen. 7/15/2018  12:41 AM    Means of arrival: Walk in  History obtained from: Patient  History limited by: None      CHIEF COMPLAINT  Chief Complaint   Patient presents with   • Pregnancy     x19 weeks   • Vaginal Discharge       HPI  Osiris Oates is a 28 y.o  pregnant female with endometriosis, gastroparesis, and interstitial cystitis who presents to the Emergency Department for evaluation of vaginal discharge described as clear in appearance onset yesterday. The patient endorses associated bilateral lower quadrant abdominal cramping without radiation. She is currently pregnant at 19 weeks and 5 days as confirmed by prior ultrasound. The patient is seeing Dr. Boothe, Obstetrician, for her prenatal care. She reports no complications with the pregnancy thus far. The patient denies fevers, nausea, vomiting, diarrhea, dysuria, or vaginal bleeding. She states she has no concern for STI's. No alleviating or exacerbating factors are identified at this time.    REVIEW OF SYSTEMS  Pertinent positive include vaginal discharge and abdominal cramping. Pertinent negative include fevers, nausea, vomiting, diarrhea, dysuria, or vaginal bleeding. As above, all other systems are negative.  C.    PAST MEDICAL HISTORY   has a past medical history of BELLE positive; Anxiety; Endometriosis; Endometriosis; Gastroparesis; Interstitial cystitis; and Seizure disorder (HCC).    SOCIAL HISTORY  Social History     Social History Main Topics   • Smoking status: Current Every Day Smoker     Years: 3.00     Types: Cigarettes     Last attempt to quit: 2014   • Smokeless tobacco: Never Used   • Alcohol use No   • Drug use: No   • Sexual activity: None noted       SURGICAL HISTORY   has a past surgical history that includes tonsillectomy; laparoscopic destruction of endometriosis; other abdominal surgery; and appendectomy.    CURRENT MEDICATIONS  Home Medications      "Reviewed by Eliz Tomlin R.N. (Registered Nurse) on 07/15/18 at 0014  Med List Status: Partial   Medication Last Dose Status   nabumetone (RELAFEN) 500 MG Tab not taking Active   ondansetron (ZOFRAN ODT) 4 MG TABLET DISPERSIBLE  Active                ALLERGIES  Allergies   Allergen Reactions   • Codeine      Gives nausea   • Tramadol Anaphylaxis   • Vicodin [Hydrocodone-Acetaminophen]        PHYSICAL EXAM   VITAL SIGNS: /59   Pulse 83   Temp 36.4 °C (97.6 °F)   Resp 16   Ht 1.575 m (5' 2\")   Wt 57.2 kg (126 lb)   LMP 01/04/2018   SpO2 98%   BMI 23.05 kg/m²    Constitutional: Well appearing pregnant female. Alert in no apparent distress.  HENT: Normocephalic, Atraumatic. Bilateral external ears normal. Nose normal.  Moist mucous membranes.  Oropharynx clear.  Eyes: Pupils are equal and reactive. Conjunctiva normal.   Neck: Supple, full range of motion  Heart: Regular rate and rhythm.  No murmurs.    Lungs: No respiratory distress, normal work of breathing. Lungs clear to auscultation bilaterally.  Abdomen Gravid abdomen. Soft, no distention.  Suprapubic and right lower quadrant tenderness to palpation.  Pelvic: Small amount of white discharge in vaginal vault. No obvious leaking fluid. Cervical os closed. No CMT or adnexal tenderness  Musculoskeletal: Atraumatic. No obvious deformities noted.  No lower extremity edema.  Skin: Warm, Dry.  No erythema, No rash.   Neurologic: Alert and oriented x3. Moving all extremities spontaneously without focal deficits.  Psychiatric: Affect normal, Mood normal, Appears appropriate and not intoxicated.    DIAGNOSTIC STUDIES    LABS  Personally reviewed by me  Labs Reviewed   URINALYSIS,CULTURE IF INDICATED   WET PREP   CHLAMYDIA/GC PCR URINE OR SWAB       ED COURSE  Vitals:    07/15/18 0017 07/15/18 0101 07/15/18 0212 07/15/18 0258   BP:  101/63 (!) 92/56 (!) 94/60   Pulse: 83 88 88    Resp: 16 16 16    Temp:   36.6 °C (97.8 °F)    SpO2:   98%    Weight:     "   Height:             Medications administered:  Medications - No data to display    12:41 AM Patient seen and examined at bedside. The patient presents with vaginal discharge and abdominal cramping. Ordered for Urinalysis, Chlamydia/GC, and Wet Prep to evaluate.    12:48 AM Bedside ultrasound performed at this time that indicates good fetal movement with fetal heart rate of 136-158 bpm.     MEDICAL DECISION MAKING  Approximately 20 week pregnant female who presents with abdominal cramping and concern for vaginal discharge for the last 2 days. She is afebrile with normal vitals on arrival and well appearing. Bedside ultrasound demonstrates normal fetal motion and heart rate. Abdominal exam is benign without concern for bowel obstruction or perforation, appendicitis, ovarian torsion, PID or TOA. Urinalysis is negative for infection. Pelvic exam was completed with closed os and no concern for leaking amniotic fluid. Wet prep was negative for vaginal infection and gonorrhea and chlamydia are pending at this time. Patient was provided reassurance she understands importance of close follow-up with her obstetrician in addition to return precautions for changing or worsening symptoms.    The patient will return for new or worsening symptoms and is stable at the time of discharge.    DISPOSITION:  Patient will be discharged home in stable condition.    FOLLOW UP:  Your obstetrician    Schedule an appointment as soon as possible for a visit      [facility], Emergency Dept  [address] [place] [id number] [phone]    If symptoms worsen      OUTPATIENT MEDICATIONS:  Discharge Medication List as of 7/15/2018  2:42 AM          IMPRESSION  (O26.892,  R10.9) Abdominal pain during pregnancy in second trimester  (N89.8) Vaginal discharge    Results, diagnoses, and treatment options were discussed with the patient and/or family. Patient verbalized understanding of plan of care.    Discharge  Medication List as of 7/15/2018  2:42 AM               IJoel (Leelaibe), am scribing for, and in the presence of, Shilpi Jackson M.D..    Electronically signed by: Joel Allen (Kina), 7/15/2018    Shilpi STEPHENS M.D. personally performed the services described in this documentation, as scribed by Joel Allen in my presence, and it is both accurate and complete.    The note accurately reflects work and decisions made by me.  Shilpi Jackson  7/15/2018  3:22 AM

## 2018-07-15 NOTE — ED TRIAGE NOTES
Chief Complaint   Patient presents with   • Pregnancy     x19 weeks   • Vaginal Discharge       BIB WC to room, she states it hurts to walk due to cramping and that her pregnancy feels very low.  She also states she had watery discharge yesterday, but today was green and thick discharge.  She assumes it was her mucus plug.  Endorses cramping along with discharge.  Agree with triage assessment.  Changing into gown.  Chart placed for ERP eval.

## 2018-07-15 NOTE — DISCHARGE INSTRUCTIONS
You were seen in the Emergency Department for abdominal pain and vaginal discharge.    Urinalysis, vaginal swabs, ultrasound were completed without significant acute abnormalities.    Please use 1,000mg of tylenol every 6 hours as needed for pain.    Please follow up with your obstetrician as soon as possible.    Return to the Emergency Department with fevers, worsening abdominal pain, vaginal bleeding, vomiting, or other concerns.      Abdominal Pain During Pregnancy  Belly (abdominal) pain is common during pregnancy. Most of the time, it is not a serious problem. Other times, it can be a sign that something is wrong with the pregnancy. Always tell your doctor if you have belly pain.  Follow these instructions at home:  Monitor your belly pain for any changes. The following actions may help you feel better:  · Do not have sex (intercourse) or put anything in your vagina until you feel better.  · Rest until your pain stops.  · Drink clear fluids if you feel sick to your stomach (nauseous). Do not eat solid food until you feel better.  · Only take medicine as told by your doctor.  · Keep all doctor visits as told.  Get help right away if:  · You are bleeding, leaking fluid, or pieces of tissue come out of your vagina.  · You have more pain or cramping.  · You keep throwing up (vomiting).  · You have pain when you pee (urinate) or have blood in your pee.  · You have a fever.  · You do not feel your baby moving as much.  · You feel very weak or feel like passing out.  · You have trouble breathing, with or without belly pain.  · You have a very bad headache and belly pain.  · You have fluid leaking from your vagina and belly pain.  · You keep having watery poop (diarrhea).  · Your belly pain does not go away after resting, or the pain gets worse.  This information is not intended to replace advice given to you by your health care provider. Make sure you discuss any questions you have with your health care  provider.  Document Released: 12/06/2010 Document Revised: 07/26/2017 Document Reviewed: 07/17/2014  [employer] Interactive Patient Education © 2017 [employer]

## 2018-07-15 NOTE — ED TRIAGE NOTES
"Osiris Oates    Chief Complaint   Patient presents with   • Pregnancy     x19 weeks   • Vaginal Discharge       Pt wheeled to triage with above complaint. 19 weeks pregnant per pt.  Denies bloody discharge.  Discharged described as \"watery and mucus.\" +ab cramping since yesterday +discharge x 1 day.    VSS.  Pt returned to lobby, educated on triage process, and to inform staff of any changes or concerns.      Blood Pressure: 103/59, Pulse: 91, Respiration: 16, Temperature: 36.4 °C (97.6 °F), Height: 157.5 cm (5' 2\"), Weight: 57.2 kg (126 lb), Pulse Oximetry: 98 %, O2 Delivery: None (Room Air)    "

## 2018-07-16 LAB
C TRACH DNA SPEC QL NAA+PROBE: NEGATIVE
N GONORRHOEA DNA SPEC QL NAA+PROBE: NEGATIVE
SPECIMEN SOURCE: NORMAL

## 2018-08-17 ENCOUNTER — HOSPITAL ENCOUNTER (OUTPATIENT)
Facility: MEDICAL CENTER | Age: 28
End: 2018-08-17
Attending: SPECIALIST | Admitting: SPECIALIST
Payer: COMMERCIAL

## 2018-08-17 VITALS
HEIGHT: 62 IN | HEART RATE: 93 BPM | TEMPERATURE: 97.5 F | WEIGHT: 135 LBS | SYSTOLIC BLOOD PRESSURE: 114 MMHG | DIASTOLIC BLOOD PRESSURE: 75 MMHG | BODY MASS INDEX: 24.84 KG/M2

## 2018-08-17 LAB
APPEARANCE UR: CLEAR
COLOR UR AUTO: YELLOW
GLUCOSE UR QL STRIP.AUTO: NEGATIVE MG/DL
KETONES UR QL STRIP.AUTO: NEGATIVE MG/DL
LEUKOCYTE ESTERASE UR QL STRIP.AUTO: NEGATIVE
NITRITE UR QL STRIP.AUTO: NEGATIVE
PH UR STRIP.AUTO: 6 [PH]
PROT UR QL STRIP: NEGATIVE MG/DL
RBC UR QL AUTO: ABNORMAL
SP GR UR: 1.01

## 2018-08-17 PROCEDURE — 81002 URINALYSIS NONAUTO W/O SCOPE: CPT

## 2018-08-18 NOTE — PROGRESS NOTES
- 27 yo  SHANI 18 24w4d. Presents to unit c/o cramping and increased vaginal pressure that is painful that started on Wed on and off. States she thinks the cramping is about every 15 mins.  Reports +FM, denies VB or LOF. EFM and TOCO applied.   - Dr Chacon called and a message was left.   -Dr Keene called (on call for Dr Howard after ). Informed of pt status. Orders to encourage her to drink lots of fluids and discharge her home.  -Discharge orders reviewed and labor precautions given. Pt verbalized understanding.

## 2018-09-14 LAB
ABO GROUP BLD: NORMAL
BLD GP AB SCN SERPL QL: NEGATIVE
RH BLD: NORMAL

## 2018-09-30 ENCOUNTER — HOSPITAL ENCOUNTER (EMERGENCY)
Facility: MEDICAL CENTER | Age: 28
End: 2018-09-30
Attending: EMERGENCY MEDICINE
Payer: COMMERCIAL

## 2018-09-30 VITALS
TEMPERATURE: 97.3 F | HEART RATE: 100 BPM | RESPIRATION RATE: 18 BRPM | BODY MASS INDEX: 25.96 KG/M2 | WEIGHT: 141.09 LBS | HEIGHT: 62 IN | SYSTOLIC BLOOD PRESSURE: 112 MMHG | OXYGEN SATURATION: 98 % | DIASTOLIC BLOOD PRESSURE: 69 MMHG

## 2018-09-30 DIAGNOSIS — R19.7 NAUSEA VOMITING AND DIARRHEA: ICD-10-CM

## 2018-09-30 DIAGNOSIS — R11.2 NAUSEA VOMITING AND DIARRHEA: ICD-10-CM

## 2018-09-30 DIAGNOSIS — Z3A.30 30 WEEKS GESTATION OF PREGNANCY: ICD-10-CM

## 2018-09-30 LAB
ALBUMIN SERPL BCP-MCNC: 3.9 G/DL (ref 3.2–4.9)
ALBUMIN/GLOB SERPL: 1.2 G/DL
ALP SERPL-CCNC: 80 U/L (ref 30–99)
ALT SERPL-CCNC: 13 U/L (ref 2–50)
ANION GAP SERPL CALC-SCNC: 7 MMOL/L (ref 0–11.9)
APPEARANCE UR: CLEAR
AST SERPL-CCNC: 16 U/L (ref 12–45)
BASOPHILS # BLD AUTO: 0.4 % (ref 0–1.8)
BASOPHILS # BLD: 0.07 K/UL (ref 0–0.12)
BILIRUB SERPL-MCNC: 0.3 MG/DL (ref 0.1–1.5)
BILIRUB UR QL STRIP.AUTO: NEGATIVE
BUN SERPL-MCNC: 11 MG/DL (ref 8–22)
CALCIUM SERPL-MCNC: 8.7 MG/DL (ref 8.4–10.2)
CHLORIDE SERPL-SCNC: 107 MMOL/L (ref 96–112)
CO2 SERPL-SCNC: 21 MMOL/L (ref 20–33)
COLOR UR: YELLOW
CREAT SERPL-MCNC: 0.51 MG/DL (ref 0.5–1.4)
EOSINOPHIL # BLD AUTO: 0.02 K/UL (ref 0–0.51)
EOSINOPHIL NFR BLD: 0.1 % (ref 0–6.9)
ERYTHROCYTE [DISTWIDTH] IN BLOOD BY AUTOMATED COUNT: 45.4 FL (ref 35.9–50)
GLOBULIN SER CALC-MCNC: 3.3 G/DL (ref 1.9–3.5)
GLUCOSE SERPL-MCNC: 91 MG/DL (ref 65–99)
GLUCOSE UR STRIP.AUTO-MCNC: NEGATIVE MG/DL
HCT VFR BLD AUTO: 41.2 % (ref 37–47)
HGB BLD-MCNC: 14.1 G/DL (ref 12–16)
IMM GRANULOCYTES # BLD AUTO: 0.19 K/UL (ref 0–0.11)
IMM GRANULOCYTES NFR BLD AUTO: 1 % (ref 0–0.9)
KETONES UR STRIP.AUTO-MCNC: NEGATIVE MG/DL
LEUKOCYTE ESTERASE UR QL STRIP.AUTO: NEGATIVE
LIPASE SERPL-CCNC: 26 U/L (ref 7–58)
LYMPHOCYTES # BLD AUTO: 0.95 K/UL (ref 1–4.8)
LYMPHOCYTES NFR BLD: 4.9 % (ref 22–41)
MCH RBC QN AUTO: 32.9 PG (ref 27–33)
MCHC RBC AUTO-ENTMCNC: 34.2 G/DL (ref 33.6–35)
MCV RBC AUTO: 96 FL (ref 81.4–97.8)
MICRO URNS: NORMAL
MONOCYTES # BLD AUTO: 0.8 K/UL (ref 0–0.85)
MONOCYTES NFR BLD AUTO: 4.1 % (ref 0–13.4)
NEUTROPHILS # BLD AUTO: 17.37 K/UL (ref 2–7.15)
NEUTROPHILS NFR BLD: 89.5 % (ref 44–72)
NITRITE UR QL STRIP.AUTO: NEGATIVE
NRBC # BLD AUTO: 0 K/UL
NRBC BLD-RTO: 0 /100 WBC
PH UR STRIP.AUTO: 6 [PH]
PLATELET # BLD AUTO: 229 K/UL (ref 164–446)
PMV BLD AUTO: 9.3 FL (ref 9–12.9)
POTASSIUM SERPL-SCNC: 3.4 MMOL/L (ref 3.6–5.5)
PROT SERPL-MCNC: 7.2 G/DL (ref 6–8.2)
PROT UR QL STRIP: NEGATIVE MG/DL
RBC # BLD AUTO: 4.29 M/UL (ref 4.2–5.4)
RBC UR QL AUTO: NEGATIVE
SODIUM SERPL-SCNC: 135 MMOL/L (ref 135–145)
SP GR UR REFRACTOMETRY: 1.02
WBC # BLD AUTO: 19.4 K/UL (ref 4.8–10.8)

## 2018-09-30 PROCEDURE — 36415 COLL VENOUS BLD VENIPUNCTURE: CPT

## 2018-09-30 PROCEDURE — 700111 HCHG RX REV CODE 636 W/ 250 OVERRIDE (IP): Performed by: EMERGENCY MEDICINE

## 2018-09-30 PROCEDURE — 96374 THER/PROPH/DIAG INJ IV PUSH: CPT

## 2018-09-30 PROCEDURE — 81003 URINALYSIS AUTO W/O SCOPE: CPT

## 2018-09-30 PROCEDURE — 85025 COMPLETE CBC W/AUTO DIFF WBC: CPT

## 2018-09-30 PROCEDURE — 80053 COMPREHEN METABOLIC PANEL: CPT

## 2018-09-30 PROCEDURE — 83690 ASSAY OF LIPASE: CPT

## 2018-09-30 PROCEDURE — 99285 EMERGENCY DEPT VISIT HI MDM: CPT

## 2018-09-30 PROCEDURE — 700105 HCHG RX REV CODE 258: Performed by: EMERGENCY MEDICINE

## 2018-09-30 RX ORDER — ONDANSETRON 2 MG/ML
4 INJECTION INTRAMUSCULAR; INTRAVENOUS ONCE
Status: COMPLETED | OUTPATIENT
Start: 2018-09-30 | End: 2018-09-30

## 2018-09-30 RX ORDER — METOCLOPRAMIDE 10 MG/1
10 TABLET ORAL EVERY 6 HOURS PRN
Qty: 20 TAB | Refills: 0 | Status: SHIPPED | OUTPATIENT
Start: 2018-09-30 | End: 2019-06-01

## 2018-09-30 RX ORDER — SODIUM CHLORIDE 9 MG/ML
1000 INJECTION, SOLUTION INTRAVENOUS CONTINUOUS
Status: DISCONTINUED | OUTPATIENT
Start: 2018-09-30 | End: 2018-09-30 | Stop reason: HOSPADM

## 2018-09-30 RX ADMIN — SODIUM CHLORIDE 1000 ML: 9 INJECTION, SOLUTION INTRAVENOUS at 13:20

## 2018-09-30 RX ADMIN — ONDANSETRON HYDROCHLORIDE 4 MG: 2 INJECTION INTRAMUSCULAR; INTRAVENOUS at 11:55

## 2018-09-30 ASSESSMENT — PAIN SCALES - GENERAL: PAINLEVEL_OUTOF10: 3

## 2018-09-30 NOTE — ED PROVIDER NOTES
ED Provider Note    CHIEF COMPLAINT  Chief Complaint   Patient presents with   • Pregnancy   • N/V        HPI  Osiris Oates is a 28 y.o. female who presents to the ED with complaints of nausea vomiting diarrhea.  The patient is a  female is approximately 30 weeks pregnant who is been around sick contacts with similar symptoms.  The patient states that last night she started with nausea vomiting and diarrhea to the point where now she cannot keep any fluids in.  The patient is attempted to take fluids but is been unable to do so.  She is feeling weak she does describe some intermittent abdominal cramps denies any overt fevers or chills does describe the nausea vomiting and diarrhea she is probably had about 4 bouts of diarrhea this morning already and multiple times of vomiting.  Patient denies any other symptoms presents for evaluation.    REVIEW OF SYSTEMS  See HPI for further details. All other systems are negative.     PAST MEDICAL HISTORY  Past Medical History:   Diagnosis Date   • BELLE positive    • Anxiety    • Endometriosis    • Endometriosis    • Gastroparesis    • Interstitial cystitis    • Seizure disorder (HCC)        FAMILY HISTORY  Family History   Problem Relation Age of Onset   • Other Mother         Wilsons Disease     Patient's family history has been discussed and is been found to be noncontributory to his present illness  SOCIAL HISTORY  Social History     Social History   • Marital status: Single     Spouse name: N/A   • Number of children: N/A   • Years of education: N/A     Social History Main Topics   • Smoking status: Current Every Day Smoker     Years: 3.00     Types: Cigarettes     Last attempt to quit: 2014   • Smokeless tobacco: Never Used   • Alcohol use No   • Drug use: No   • Sexual activity: Not on file     Other Topics Concern   • Not on file     Social History Narrative   • No narrative on file      Pcp Pt States None  The patient denies any significant tobacco,  "alcohol or drug use      SURGICAL HISTORY  Past Surgical History:   Procedure Laterality Date   • APPENDECTOMY     • LAPAROSCOPIC DESTRUCTION OF ENDOMETRIOSIS     • OTHER ABDOMINAL SURGERY     • TONSILLECTOMY         CURRENT MEDICATIONS  Home Medications    **Home medications have not yet been reviewed for this encounter**       No current facility-administered medications on file prior to encounter.      Current Outpatient Prescriptions on File Prior to Encounter   Medication Sig Dispense Refill   • ondansetron (ZOFRAN ODT) 4 MG TABLET DISPERSIBLE Take 1 Tab by mouth every 6 hours as needed for Nausea. 10 Tab 0   • nabumetone (RELAFEN) 500 MG Tab Take 1 Tab by mouth 2 times a day. 20 Tab 0         ALLERGIES  Allergies   Allergen Reactions   • Codeine      Gives nausea   • Tramadol Anaphylaxis   • Vicodin [Hydrocodone-Acetaminophen]        PHYSICAL EXAM  VITAL SIGNS: /69   Pulse (!) 110   Temp 36.3 °C (97.3 °F)   Resp 18   Ht 1.575 m (5' 2\")   Wt 64 kg (141 lb 1.5 oz)   LMP 01/04/2018   SpO2 98%   BMI 25.81 kg/m²    Pulse Oximetry was obtained. It showed a reading of Pulse Oximetry: 98 %.  I interpreted this as nonhypoxic.     Constitutional: Well developed, Well nourished, No acute distress, Non-toxic appearance.   HENT: Normocephalic, Atraumatic, Bilateral external ears normal, bilateral tympanic membranes normal, Oropharynx dry mucous membranes, No oral exudates, Nose normal.   Eyes: Pupils are equal round and react to light, extraocular motions are intact, conjunctiva is normal, there are no signs of exudate.   Neck: Supple, no cervical lymphadenopathy, no meningeal signs..   Lymphatic: No lymphadenopathy noted.   Cardiovascular: Regular rate and rhythm without murmurs gallops or rubs.   Thorax & Lungs: Lungs are clear to auscultation bilaterally, there are no wheezes no rales. Chest wall is nontender.  Abdomen: Soft gravid nontender nondistended bowel sounds are present  Skin: Warm, Dry, No " erythema,   Back: No tenderness, No CVA tenderness.   Extremities: Intact distal pulses, no clubbing, no cyanosis, no edema, nontender.  Neurologic: Alert & oriented x 3, Normal motor function, Normal sensory function, No focal deficits noted.         RADIOLOGY/PROCEDURES  Results for orders placed or performed during the hospital encounter of 08/17/18   POC UA   Result Value Ref Range    POC Color Yellow     POC Appearance Clear     POC Glucose Negative Negative mg/dL    POC Ketones Negative Negative mg/dL    POC Specific Gravity 1.015 1.005 - 1.030    POC Blood Trace-intact (A) Negative    POC Urine PH 6.0 5.0 - 8.0    POC Protein Negative Negative mg/dL    POC Nitrites Negative Negative    POC Leukocyte Esterase Negative Negative         IV fluids were ordered for signs of dehydration and nausea vomiting and diarrhea.  Patient response to the fluids upon reevaluation improved she is able to tolerate p.o. fluids.      COURSE & MEDICAL DECISION MAKING  Pertinent Labs & Imaging studies reviewed. (See chart for details)  Patient presents ED for evaluation.  Clinically the patient appears well does have dry mucous membranes appears dehydrated was given a liter bolus as well as Zofran.  Now the patient is able to tolerate p.o. fluids and is feeling improved.  At this point he feels most likely a viral gastroenteritis.  She does have an elevated white blood cell count which is to be expected with vomiting as well as her pregnant state but no other signs of a bacterial infection.  This point he feels that the patient is stable for discharge and will give her prescription for Reglan as needed for nausea control recommend a follow-up with her obstetrician for further outpatient treatment care return as needed.    FINAL IMPRESSION  1. Nausea vomiting and diarrhea    2. 30 weeks gestation of pregnancy          The patient will return for new or worsening symptoms and is stable at the time of discharge.    The patient is  referred to a primary physician for blood pressure management, diabetic screening, and for all other preventative health concerns.        DISPOSITION:  Patient will be discharged home in stable condition.    FOLLOW UP:  Tl Howard M.D.  [address] [place], [place] [address]  [facility] [address]  [phone]    Schedule an appointment as soon as possible for a visit   As needed, Return if any symptoms worsen      OUTPATIENT MEDICATIONS:  New Prescriptions    METOCLOPRAMIDE (REGLAN) 10 MG TAB    Take 1 Tab by mouth every 6 hours as needed (nausea).         Electronically signed by: Aaron Blanco, 9/30/2018 11:39 AM

## 2018-09-30 NOTE — ED TRIAGE NOTES
"Pt is accompanied by family with a 30-week pregnancy history.  She C/O acute onset of N/V/D recurring since approximately 0300 this AM. She suspects a possible exposure to \"sikness.\"   "

## 2018-09-30 NOTE — ED NOTES
Break RN:  Resting comfortably.  IV fluids infusing.   
Discharged to home with instructions and prescription called to pharmacy  Patient to follow up with her doctor or return here for worsening symptoms  
IV established with blood draw and med given for nausea  FHT = 120  Patient state she can feel baby moving  Mom at bedside  
Med Rec completed per Pt at bedside  Allergies reviewed  No ABX in last 30 days    Pt denies taking any RX's or OTC's    Ok per Pt to discuss medications with visitor/s present    
Patient able to keep water down  
Patient baseline mental status

## 2018-10-31 LAB — HIV 1/2 AB DIFF 083947: NEGATIVE

## 2018-12-03 ENCOUNTER — APPOINTMENT (OUTPATIENT)
Dept: OBGYN | Facility: MEDICAL CENTER | Age: 28
End: 2018-12-03
Attending: SPECIALIST
Payer: COMMERCIAL

## 2018-12-03 ENCOUNTER — HOSPITAL ENCOUNTER (INPATIENT)
Facility: MEDICAL CENTER | Age: 28
LOS: 3 days | End: 2018-12-06
Attending: SPECIALIST | Admitting: SPECIALIST
Payer: COMMERCIAL

## 2018-12-03 DIAGNOSIS — R10.2 PELVIC PAIN: Primary | ICD-10-CM

## 2018-12-03 LAB
BASOPHILS # BLD AUTO: 0.3 % (ref 0–1.8)
BASOPHILS # BLD: 0.05 K/UL (ref 0–0.12)
EOSINOPHIL # BLD AUTO: 0.01 K/UL (ref 0–0.51)
EOSINOPHIL NFR BLD: 0.1 % (ref 0–6.9)
ERYTHROCYTE [DISTWIDTH] IN BLOOD BY AUTOMATED COUNT: 47.6 FL (ref 35.9–50)
HCT VFR BLD AUTO: 38.4 % (ref 37–47)
HGB BLD-MCNC: 13.2 G/DL (ref 12–16)
HOLDING TUBE BB 8507: NORMAL
IMM GRANULOCYTES # BLD AUTO: 0.16 K/UL (ref 0–0.11)
IMM GRANULOCYTES NFR BLD AUTO: 1 % (ref 0–0.9)
LYMPHOCYTES # BLD AUTO: 2.92 K/UL (ref 1–4.8)
LYMPHOCYTES NFR BLD: 17.7 % (ref 22–41)
MCH RBC QN AUTO: 32.4 PG (ref 27–33)
MCHC RBC AUTO-ENTMCNC: 34.4 G/DL (ref 33.6–35)
MCV RBC AUTO: 94.1 FL (ref 81.4–97.8)
MONOCYTES # BLD AUTO: 1.14 K/UL (ref 0–0.85)
MONOCYTES NFR BLD AUTO: 6.9 % (ref 0–13.4)
NEUTROPHILS # BLD AUTO: 12.18 K/UL (ref 2–7.15)
NEUTROPHILS NFR BLD: 74 % (ref 44–72)
NRBC # BLD AUTO: 0 K/UL
NRBC BLD-RTO: 0 /100 WBC
PLATELET # BLD AUTO: 228 K/UL (ref 164–446)
PMV BLD AUTO: 9.9 FL (ref 9–12.9)
RBC # BLD AUTO: 4.08 M/UL (ref 4.2–5.4)
WBC # BLD AUTO: 16.5 K/UL (ref 4.8–10.8)

## 2018-12-03 PROCEDURE — A9270 NON-COVERED ITEM OR SERVICE: HCPCS | Performed by: SPECIALIST

## 2018-12-03 PROCEDURE — 770002 HCHG ROOM/CARE - OB PRIVATE (112)

## 2018-12-03 PROCEDURE — 700102 HCHG RX REV CODE 250 W/ 637 OVERRIDE(OP): Performed by: SPECIALIST

## 2018-12-03 PROCEDURE — 85025 COMPLETE CBC W/AUTO DIFF WBC: CPT

## 2018-12-03 RX ORDER — ONDANSETRON 4 MG/1
4 TABLET, ORALLY DISINTEGRATING ORAL EVERY 6 HOURS PRN
Status: DISCONTINUED | OUTPATIENT
Start: 2018-12-03 | End: 2018-12-06 | Stop reason: HOSPADM

## 2018-12-03 RX ORDER — ONDANSETRON 2 MG/ML
4 INJECTION INTRAMUSCULAR; INTRAVENOUS EVERY 6 HOURS PRN
Status: DISCONTINUED | OUTPATIENT
Start: 2018-12-03 | End: 2018-12-06 | Stop reason: HOSPADM

## 2018-12-03 RX ORDER — SODIUM CHLORIDE, SODIUM LACTATE, POTASSIUM CHLORIDE, CALCIUM CHLORIDE 600; 310; 30; 20 MG/100ML; MG/100ML; MG/100ML; MG/100ML
INJECTION, SOLUTION INTRAVENOUS CONTINUOUS
Status: DISPENSED | OUTPATIENT
Start: 2018-12-03 | End: 2018-12-04

## 2018-12-03 RX ADMIN — MISOPROSTOL 25 MCG: 100 TABLET ORAL at 22:59

## 2018-12-03 ASSESSMENT — PATIENT HEALTH QUESTIONNAIRE - PHQ9
SUM OF ALL RESPONSES TO PHQ9 QUESTIONS 1 AND 2: 0
1. LITTLE INTEREST OR PLEASURE IN DOING THINGS: NOT AT ALL
2. FEELING DOWN, DEPRESSED, IRRITABLE, OR HOPELESS: NOT AT ALL

## 2018-12-03 ASSESSMENT — COPD QUESTIONNAIRES
DURING THE PAST 4 WEEKS HOW MUCH DID YOU FEEL SHORT OF BREATH: NONE/LITTLE OF THE TIME
HAVE YOU SMOKED AT LEAST 100 CIGARETTES IN YOUR ENTIRE LIFE: NO/DON'T KNOW
IN THE PAST 12 MONTHS DO YOU DO LESS THAN YOU USED TO BECAUSE OF YOUR BREATHING PROBLEMS: DISAGREE/UNSURE
COPD SCREENING SCORE: 0
DO YOU EVER COUGH UP ANY MUCUS OR PHLEGM?: NO/ONLY WITH OCCASIONAL COLDS OR INFECTIONS

## 2018-12-03 ASSESSMENT — LIFESTYLE VARIABLES
EVER_SMOKED: YES
ALCOHOL_USE: NO

## 2018-12-04 LAB — STREP GP B DNA PCR: NEGATIVE

## 2018-12-04 PROCEDURE — A9270 NON-COVERED ITEM OR SERVICE: HCPCS | Performed by: SPECIALIST

## 2018-12-04 PROCEDURE — C1726 CATH, BAL DIL, NON-VASCULAR: HCPCS

## 2018-12-04 PROCEDURE — 700111 HCHG RX REV CODE 636 W/ 250 OVERRIDE (IP)

## 2018-12-04 PROCEDURE — 700101 HCHG RX REV CODE 250

## 2018-12-04 PROCEDURE — 700105 HCHG RX REV CODE 258

## 2018-12-04 PROCEDURE — 59200 INSERT CERVICAL DILATOR: CPT

## 2018-12-04 PROCEDURE — 700102 HCHG RX REV CODE 250 W/ 637 OVERRIDE(OP): Performed by: SPECIALIST

## 2018-12-04 PROCEDURE — 36415 COLL VENOUS BLD VENIPUNCTURE: CPT

## 2018-12-04 PROCEDURE — 700105 HCHG RX REV CODE 258: Performed by: SPECIALIST

## 2018-12-04 PROCEDURE — 700111 HCHG RX REV CODE 636 W/ 250 OVERRIDE (IP): Performed by: SPECIALIST

## 2018-12-04 PROCEDURE — 770002 HCHG ROOM/CARE - OB PRIVATE (112)

## 2018-12-04 RX ORDER — ROPIVACAINE HYDROCHLORIDE 2 MG/ML
INJECTION, SOLUTION EPIDURAL; INFILTRATION; PERINEURAL
Status: COMPLETED
Start: 2018-12-04 | End: 2018-12-04

## 2018-12-04 RX ORDER — SODIUM CHLORIDE, SODIUM LACTATE, POTASSIUM CHLORIDE, CALCIUM CHLORIDE 600; 310; 30; 20 MG/100ML; MG/100ML; MG/100ML; MG/100ML
INJECTION, SOLUTION INTRAVENOUS CONTINUOUS
Status: DISCONTINUED | OUTPATIENT
Start: 2018-12-04 | End: 2018-12-06 | Stop reason: HOSPADM

## 2018-12-04 RX ORDER — TERBUTALINE SULFATE 1 MG/ML
0.25 INJECTION, SOLUTION SUBCUTANEOUS ONCE
Status: COMPLETED | OUTPATIENT
Start: 2018-12-04 | End: 2018-12-04

## 2018-12-04 RX ORDER — ACETAMINOPHEN 325 MG/1
650 TABLET ORAL EVERY 4 HOURS PRN
Status: DISCONTINUED | OUTPATIENT
Start: 2018-12-04 | End: 2018-12-06 | Stop reason: HOSPADM

## 2018-12-04 RX ORDER — SODIUM CHLORIDE, SODIUM LACTATE, POTASSIUM CHLORIDE, CALCIUM CHLORIDE 600; 310; 30; 20 MG/100ML; MG/100ML; MG/100ML; MG/100ML
INJECTION, SOLUTION INTRAVENOUS
Status: COMPLETED
Start: 2018-12-04 | End: 2018-12-04

## 2018-12-04 RX ADMIN — ONDANSETRON 4 MG: 2 INJECTION INTRAMUSCULAR; INTRAVENOUS at 20:07

## 2018-12-04 RX ADMIN — ACETAMINOPHEN 650 MG: 325 TABLET, FILM COATED ORAL at 21:35

## 2018-12-04 RX ADMIN — FENTANYL CITRATE 100 MCG: 50 INJECTION, SOLUTION INTRAMUSCULAR; INTRAVENOUS at 12:37

## 2018-12-04 RX ADMIN — TERBUTALINE SULFATE 0.25 MG: 1 INJECTION, SOLUTION SUBCUTANEOUS at 02:38

## 2018-12-04 RX ADMIN — ROPIVACAINE HYDROCHLORIDE 100 ML: 2 INJECTION, SOLUTION EPIDURAL; INFILTRATION at 16:29

## 2018-12-04 RX ADMIN — SODIUM CHLORIDE, POTASSIUM CHLORIDE, SODIUM LACTATE AND CALCIUM CHLORIDE: 600; 310; 30; 20 INJECTION, SOLUTION INTRAVENOUS at 16:47

## 2018-12-04 RX ADMIN — Medication 2 MILLI-UNITS/MIN: at 21:35

## 2018-12-04 RX ADMIN — SODIUM CHLORIDE, POTASSIUM CHLORIDE, SODIUM LACTATE AND CALCIUM CHLORIDE 1000 ML: 600; 310; 30; 20 INJECTION, SOLUTION INTRAVENOUS at 16:32

## 2018-12-04 RX ADMIN — SODIUM CHLORIDE, POTASSIUM CHLORIDE, SODIUM LACTATE AND CALCIUM CHLORIDE: 600; 310; 30; 20 INJECTION, SOLUTION INTRAVENOUS at 00:58

## 2018-12-04 RX ADMIN — SODIUM CHLORIDE, POTASSIUM CHLORIDE, SODIUM LACTATE AND CALCIUM CHLORIDE: 600; 310; 30; 20 INJECTION, SOLUTION INTRAVENOUS at 01:46

## 2018-12-04 RX ADMIN — SODIUM CHLORIDE, POTASSIUM CHLORIDE, SODIUM LACTATE AND CALCIUM CHLORIDE: 600; 310; 30; 20 INJECTION, SOLUTION INTRAVENOUS at 09:54

## 2018-12-04 RX ADMIN — ROPIVACAINE HYDROCHLORIDE 100 ML: 2 INJECTION, SOLUTION EPIDURAL; INFILTRATION; PERINEURAL at 16:29

## 2018-12-04 ASSESSMENT — PATIENT HEALTH QUESTIONNAIRE - PHQ9
1. LITTLE INTEREST OR PLEASURE IN DOING THINGS: NOT AT ALL
SUM OF ALL RESPONSES TO PHQ9 QUESTIONS 1 AND 2: 0
2. FEELING DOWN, DEPRESSED, IRRITABLE, OR HOPELESS: NOT AT ALL

## 2018-12-04 ASSESSMENT — PAIN SCALES - GENERAL: PAINLEVEL_OUTOF10: 6

## 2018-12-04 NOTE — PROGRESS NOTES
2130 Pt into room 221 for schedule IOL with FOB and mother.  2137 External monitors applied and admission started.   2200 Dr. Howard phoned, report given, orders received.   0120 Dr. Howard  At bedside to evaluate pt. Orders received to continue fluid bolus and then to reevaluate after bolus is finished to determine if pt needs terbutaline.    0700 Report given to Teofilo SMALLWOOD. POC discussed.

## 2018-12-04 NOTE — PROGRESS NOTES
0700- report received from PAT Jules RN. Plan of care discussed. Patient resting comfortably. Denies any needs at this time.     0800- Dr Howard at bedside. Okay for patient to have a light meal.    1015- Dr Howard in to see patient. Will continue to monitor. Patient. Will be back at lunch for a cervical exam and possible balloon placement.     1320- Dr Howard at bedside . Cook balloon placed. 60/60 in each balloon.     1600-patient sitting up for epidural.     1625- epidural placed by Dr Hilliard    1640- Dr Howard updated on patient will continue to monitor.

## 2018-12-04 NOTE — CARE PLAN
Problem: Pain  Goal: Alleviation of Pain or a reduction in pain to the patient's comfort goal  Outcome: PROGRESSING AS EXPECTED  Patient feeling a increase in contractions. Denies any pain intervention needed at this time.     Problem: Risk for Infection, Impaired Wound Healing  Goal: Remain free from signs and symptoms of infection  Outcome: PROGRESSING AS EXPECTED  Patient remains afebrile. No S&S of infections

## 2018-12-04 NOTE — CARE PLAN
Problem: Knowledge Deficit  Goal: Patient/Support person demonstrates understanding regarding the progression of labor, available options and participates in decision-making process  POC dicussed with pt. Pt verbalized agreement with plan. Pt denies any needs at this time    Problem: Risk for injury  Goal: Patient and fetus will be free of preventable injury/complications    Intervention: Monitor Fetal well being  FHT's will be monitored continuously throughout labor

## 2018-12-05 LAB
ERYTHROCYTE [DISTWIDTH] IN BLOOD BY AUTOMATED COUNT: 49.7 FL (ref 35.9–50)
HCT VFR BLD AUTO: 35.2 % (ref 37–47)
HGB BLD-MCNC: 11.4 G/DL (ref 12–16)
MCH RBC QN AUTO: 31.4 PG (ref 27–33)
MCHC RBC AUTO-ENTMCNC: 32.4 G/DL (ref 33.6–35)
MCV RBC AUTO: 97 FL (ref 81.4–97.8)
PLATELET # BLD AUTO: 192 K/UL (ref 164–446)
PMV BLD AUTO: 10.3 FL (ref 9–12.9)
RBC # BLD AUTO: 3.63 M/UL (ref 4.2–5.4)
WBC # BLD AUTO: 16.9 K/UL (ref 4.8–10.8)

## 2018-12-05 PROCEDURE — 3E0P7VZ INTRODUCTION OF HORMONE INTO FEMALE REPRODUCTIVE, VIA NATURAL OR ARTIFICIAL OPENING: ICD-10-PCS | Performed by: SPECIALIST

## 2018-12-05 PROCEDURE — 700102 HCHG RX REV CODE 250 W/ 637 OVERRIDE(OP): Performed by: SPECIALIST

## 2018-12-05 PROCEDURE — 10907ZC DRAINAGE OF AMNIOTIC FLUID, THERAPEUTIC FROM PRODUCTS OF CONCEPTION, VIA NATURAL OR ARTIFICIAL OPENING: ICD-10-PCS | Performed by: SPECIALIST

## 2018-12-05 PROCEDURE — 770002 HCHG ROOM/CARE - OB PRIVATE (112)

## 2018-12-05 PROCEDURE — A9270 NON-COVERED ITEM OR SERVICE: HCPCS | Performed by: SPECIALIST

## 2018-12-05 PROCEDURE — 36415 COLL VENOUS BLD VENIPUNCTURE: CPT

## 2018-12-05 PROCEDURE — 0HQ9XZZ REPAIR PERINEUM SKIN, EXTERNAL APPROACH: ICD-10-PCS | Performed by: SPECIALIST

## 2018-12-05 PROCEDURE — 700111 HCHG RX REV CODE 636 W/ 250 OVERRIDE (IP): Performed by: SPECIALIST

## 2018-12-05 PROCEDURE — 303615 HCHG EPIDURAL/SPINAL ANESTHESIA FOR LABOR

## 2018-12-05 PROCEDURE — 304965 HCHG RECOVERY SERVICES

## 2018-12-05 PROCEDURE — 3E033VJ INTRODUCTION OF OTHER HORMONE INTO PERIPHERAL VEIN, PERCUTANEOUS APPROACH: ICD-10-PCS | Performed by: SPECIALIST

## 2018-12-05 PROCEDURE — 10H07YZ INSERTION OF OTHER DEVICE INTO PRODUCTS OF CONCEPTION, VIA NATURAL OR ARTIFICIAL OPENING: ICD-10-PCS | Performed by: SPECIALIST

## 2018-12-05 PROCEDURE — 85027 COMPLETE CBC AUTOMATED: CPT

## 2018-12-05 PROCEDURE — 59409 OBSTETRICAL CARE: CPT

## 2018-12-05 RX ORDER — IBUPROFEN 800 MG/1
800 TABLET ORAL EVERY 8 HOURS PRN
Status: DISCONTINUED | OUTPATIENT
Start: 2018-12-05 | End: 2018-12-06 | Stop reason: HOSPADM

## 2018-12-05 RX ORDER — SODIUM CHLORIDE, SODIUM LACTATE, POTASSIUM CHLORIDE, AND CALCIUM CHLORIDE .6; .31; .03; .02 G/100ML; G/100ML; G/100ML; G/100ML
1000 INJECTION, SOLUTION INTRAVENOUS
Status: DISCONTINUED | OUTPATIENT
Start: 2018-12-05 | End: 2018-12-05 | Stop reason: HOSPADM

## 2018-12-05 RX ORDER — VITAMIN A ACETATE, BETA CAROTENE, ASCORBIC ACID, CHOLECALCIFEROL, .ALPHA.-TOCOPHEROL ACETATE, DL-, THIAMINE MONONITRATE, RIBOFLAVIN, NIACINAMIDE, PYRIDOXINE HYDROCHLORIDE, FOLIC ACID, CYANOCOBALAMIN, CALCIUM CARBONATE, FERROUS FUMARATE, ZINC OXIDE, CUPRIC OXIDE 3080; 12; 120; 400; 1; 1.84; 3; 20; 22; 920; 25; 200; 27; 10; 2 [IU]/1; UG/1; MG/1; [IU]/1; MG/1; MG/1; MG/1; MG/1; MG/1; [IU]/1; MG/1; MG/1; MG/1; MG/1; MG/1
1 TABLET, FILM COATED ORAL EVERY MORNING
Status: DISCONTINUED | OUTPATIENT
Start: 2018-12-05 | End: 2018-12-06 | Stop reason: HOSPADM

## 2018-12-05 RX ORDER — SODIUM CHLORIDE, SODIUM LACTATE, POTASSIUM CHLORIDE, CALCIUM CHLORIDE 600; 310; 30; 20 MG/100ML; MG/100ML; MG/100ML; MG/100ML
INJECTION, SOLUTION INTRAVENOUS PRN
Status: DISCONTINUED | OUTPATIENT
Start: 2018-12-05 | End: 2018-12-06 | Stop reason: HOSPADM

## 2018-12-05 RX ORDER — ROPIVACAINE HYDROCHLORIDE 2 MG/ML
INJECTION, SOLUTION EPIDURAL; INFILTRATION; PERINEURAL CONTINUOUS
Status: DISCONTINUED | OUTPATIENT
Start: 2018-12-05 | End: 2018-12-06 | Stop reason: HOSPADM

## 2018-12-05 RX ORDER — SODIUM CHLORIDE, SODIUM LACTATE, POTASSIUM CHLORIDE, AND CALCIUM CHLORIDE .6; .31; .03; .02 G/100ML; G/100ML; G/100ML; G/100ML
250 INJECTION, SOLUTION INTRAVENOUS PRN
Status: DISCONTINUED | OUTPATIENT
Start: 2018-12-05 | End: 2018-12-05 | Stop reason: HOSPADM

## 2018-12-05 RX ORDER — IBUPROFEN 600 MG/1
600 TABLET ORAL EVERY 6 HOURS PRN
Status: DISCONTINUED | OUTPATIENT
Start: 2018-12-05 | End: 2018-12-06 | Stop reason: HOSPADM

## 2018-12-05 RX ORDER — METHYLERGONOVINE MALEATE 0.2 MG/ML
0.2 INJECTION INTRAVENOUS
Status: DISCONTINUED | OUTPATIENT
Start: 2018-12-05 | End: 2018-12-06 | Stop reason: HOSPADM

## 2018-12-05 RX ORDER — OXYCODONE HYDROCHLORIDE AND ACETAMINOPHEN 5; 325 MG/1; MG/1
1 TABLET ORAL EVERY 4 HOURS PRN
Status: DISCONTINUED | OUTPATIENT
Start: 2018-12-05 | End: 2018-12-06 | Stop reason: HOSPADM

## 2018-12-05 RX ORDER — OXYCODONE HYDROCHLORIDE AND ACETAMINOPHEN 5; 325 MG/1; MG/1
2 TABLET ORAL EVERY 4 HOURS PRN
Status: DISCONTINUED | OUTPATIENT
Start: 2018-12-05 | End: 2018-12-06 | Stop reason: HOSPADM

## 2018-12-05 RX ORDER — MISOPROSTOL 200 UG/1
800 TABLET ORAL
Status: DISCONTINUED | OUTPATIENT
Start: 2018-12-05 | End: 2018-12-05 | Stop reason: HOSPADM

## 2018-12-05 RX ORDER — DOCUSATE SODIUM 100 MG/1
100 CAPSULE, LIQUID FILLED ORAL 2 TIMES DAILY PRN
Status: DISCONTINUED | OUTPATIENT
Start: 2018-12-05 | End: 2018-12-06 | Stop reason: HOSPADM

## 2018-12-05 RX ADMIN — IBUPROFEN 800 MG: 800 TABLET, FILM COATED ORAL at 02:09

## 2018-12-05 RX ADMIN — IBUPROFEN 800 MG: 800 TABLET, FILM COATED ORAL at 15:39

## 2018-12-05 RX ADMIN — Medication 125 ML/HR: at 02:12

## 2018-12-05 RX ADMIN — OXYCODONE AND ACETAMINOPHEN 1 TABLET: 5; 325 TABLET ORAL at 15:39

## 2018-12-05 RX ADMIN — OXYCODONE AND ACETAMINOPHEN 1 TABLET: 5; 325 TABLET ORAL at 22:16

## 2018-12-05 RX ADMIN — Medication 1 TABLET: at 05:02

## 2018-12-05 RX ADMIN — OXYCODONE AND ACETAMINOPHEN 1 TABLET: 5; 325 TABLET ORAL at 02:32

## 2018-12-05 RX ADMIN — OXYCODONE AND ACETAMINOPHEN 1 TABLET: 5; 325 TABLET ORAL at 02:09

## 2018-12-05 RX ADMIN — OXYCODONE AND ACETAMINOPHEN 1 TABLET: 5; 325 TABLET ORAL at 10:22

## 2018-12-05 RX ADMIN — OXYCODONE AND ACETAMINOPHEN 1 TABLET: 5; 325 TABLET ORAL at 08:57

## 2018-12-05 RX ADMIN — Medication 2000 ML/HR: at 01:00

## 2018-12-05 ASSESSMENT — PAIN SCALES - GENERAL
PAINLEVEL_OUTOF10: 3
PAINLEVEL_OUTOF10: 7
PAINLEVEL_OUTOF10: 4

## 2018-12-05 NOTE — PROGRESS NOTES
Normal spontaneous vaginal delivery.  Live term male .   Apgar scores of 6 and 8 at one and five minutes respectively.   Weight 3,475 grams.   Baby was delivered over a vaginal / perineal laceration, under continuous epidural anesthesia.   The placenta was simply delivered and examined and found to be complete.   Examination of the vulva and vagina revealed a midline vaginal/perineal laceration which perhaps could be compared to a first-degree episiotomy and this laceration was repaired in layers in the usual fashion using both 2-0 chromic 3-0 chromic.  Next a bimanual vaginal exam was performed and revealed that the uterus was firm and that there were no gauze sponges in the vagina.  Next digital rectal exam was performed and revealed that there the anal sphincter was circumferentially intact and that the rectal mucosa was intact and that there were no sutures in the rectum.  The estimated blood loss was approximately 200 cc.  Tl Howard MD

## 2018-12-05 NOTE — CARE PLAN
Problem: Fluid Volume:  Goal: Will maintain balanced intake and output  Second bag of Pitocin stopped.  Pt. Drinking adequate amounts of po fluids and urine output is adequate

## 2018-12-05 NOTE — PROGRESS NOTES
Patient arrived to S320 with infant and SO. Discussed POC and oriented patient to room, call light, emergency cords, educational videos, and unit policies. All questions answered.

## 2018-12-05 NOTE — CARE PLAN
Problem: Pain Management  Goal: Pain level will decrease to patient's comfort goal  Medicated with one Percocet for cramping ( 6 on pain scale).  One hour later, pt requested second pain pill which was given and pt reports feeling much better now

## 2018-12-05 NOTE — PROGRESS NOTES
The patient is a very pleasant 28-year-old nullipara who is today 40 weeks and 1 day gestation.  She was admitted to labor and delivery yesterday evening, approximately 24 hours ago, for induction of labor for postdates.  On admission to labor and delivery yesterday evening her cervix was found to be not favorable and so she was given a dose of Cytotec 25 mcg per vagina.  Not long after she was given this though she began having very frequent uterine contractions occurring every minute.  The fetal heart tracing remained reassuring.  Because of concern of uterine hyperstimulation she was given a bolus IV fluid (1 L) and a dose of terbutaline subcutaneously.  Subsequently her contractions continued but somewhat less frequently.  At approximately 1:00 to 1:30 PM today I checked her cervix and found her cervix to be about 1-2 cm dilated or perhaps 2 cm dilated and I did at that time, namely at about 1:00 to 1:30 PM place a Cook double balloon cervical ripening catheter in 60 cc of fluid were placed in each balloon.  The patient subsequently (this afternoon, later in the afternoon) received a labor epidural because she was having more pain with her contractions after the placement of the Cook double balloon cervical ripening catheter.  Then at about 8:30 PM I removed the Cook double balloon cervical ripening catheter and found her cervix to be at least 5 cm dilated and 80% effaced and the station was -2 station and I then did at that time namely at about 8:30 PM performed artificial rupture membranes and abundant clear amniotic fluid was observed and then I placed a intrauterine pressure catheter.  The fetal heart tracing is category 1.  We will continue electronic fetal monitoring and anticipate an obstetrical delivery.  Tl Howard MD

## 2018-12-05 NOTE — PROGRESS NOTES
" Report received from JEAN-PIERRE Flores RN at bedside and assumed care. POC discussed with pt and s/o and encouraged to state needs or questions at any time. Pt assisted with frequent position changes utilizing pillows, wedge, and peanut ball for comfort.     Dr. Howard at bedside, POC discussed with pt and family. Cooks balloon removed. AROM clear fluid. SVE by provider . IUPC placed.     Dr. Howard called and given update, orders received.     Pt assisted to left side, then right side     SVE by SHAWNA Gonzalez RN 2244 Pt states feeling pressure. SVE by Dr. Howard     2304 Pt states \"I have to push.\" SVE by Dr. Howard 0    2310 Pt started pushing, RN at bedside through delivery    2317 10L/min of O2 administered through delivery    0050 Dr. Howard at bedside through delivery    0057  of viable male infant, apgars 6/8    0100 Delivery of placenta S/I. Fundal rubs performed per protocol, fundus firm two fingerbredths below U with lochia rubra each time    0230 Pt up to the bathroom and voided.    0250 Pt transferred to  via wheelchair with infant in arms. Pt and infant stable.    255 Report given to JUSTIN Espitia RN at bedside. Bands verified and cuddles active.  "

## 2018-12-05 NOTE — PROGRESS NOTES
DATE OF SERVICE:  12/04/2018    The patient is a very pleasant 28-year-old nullipara who has had her prenatal   care with myself during the course of this pregnancy and she was admitted to   [facility] labor and delivery yesterday evening at 40   weeks gestation for postdates induction.  On presentation to labor and   delivery yesterday evening, her cervix was found to be unfavorable and so she   was given a dose of Cytotec 25 mcg per vagina.  Within 1 hour, she began   having very frequent and painful uterine contractions occurring with a   frequency of about once every 1 minute.  She was given 1 liter bolus of IV   fluid followed by dose of terbutaline.  Her contractions did become somewhat   less frequent and so throughout the night and this morning, she continues to   have frequent and painful uterine contractions.  Recent cervical exam reveals   that the cervix is 1-2 cm dilated, 50% effaced and -1 to -2 station.  I just   checked her cervix again and found her cervix to be about the same and placed   a Cook double balloon cervical ripening catheter during digital cervical exam   and this was inserted through the endocervical canal into the intrauterine   cavity and the inner balloon was inflated with 60 mL of water and then the   outer balloon was inflated with 60 mL of water and the patient tolerated this   well.  The fetal heart tracing is reactive.  We will give analgesia as needed   and plan on removing the balloon (removing the Cook double balloon cervical   ripening catheter) in several hours and anticipate an obstetrical delivery.       ____________________________________     Tl Howard MD    MED / NTS    DD:  12/04/2018 13:32:52  DT:  12/04/2018 23:26:07    D#:  [id number]  Job#:  [id number]

## 2018-12-05 NOTE — LACTATION NOTE
This note was copied from a baby's chart.  Met with parents to review the [facility] parent education and breastfeeding support book. Encouraged skin to skin time and obtaining support from nursing staff.Discussed breastfeeding plan with mother, she agreed to have her nurse observe the next feeding. She feels that baby is basically learning how to latch. She will watch the educational videos today, instructions on how to do that were provided. Encouraged her to spend time skin to skin with baby and ask for assistance if needed.

## 2018-12-05 NOTE — CARE PLAN
Problem: Altered physiologic condition related to immediate post-delivery state and potential for bleeding/hemorrhage  Goal: Patient physiologically stable as evidenced by normal lochia, palpable uterine involution and vital signs within normal limits  Fundus firm, lochia light    Problem: Potential for postpartum infection related to presence of episiotomy/vaginal tear and/or uterine contamination  Goal: Patient will be absent from signs and symptoms of infection  No s/s of infection

## 2018-12-05 NOTE — CARE PLAN
Problem: Pain  Goal: Alleviation of Pain or a reduction in pain to the patient's comfort goal  Outcome: PROGRESSING AS EXPECTED  Pt states feeling comfortable and free from pain with epidural in place. Will continue to assess.    Problem: Risk for Infection, Impaired Wound Healing  Goal: Remain free from signs and symptoms of infection  Outcome: PROGRESSING AS EXPECTED  Pt is afebrile and free from s/s of infection at this time. Will continue to assess.

## 2018-12-05 NOTE — L&D DELIVERY NOTE
DATE OF SERVICE:  12/05/2018    The patient is a very pleasant 28-year-old (on admission nullipara) with   prenatal care with myself during the course of this pregnancy and her due date   with this pregnancy was established as 12/03/2018.  She was admitted in the   evening of Monday 12/03/2018 at 40 weeks and 0 days' gestation and was   admitted for induction of labor for postdates.  On admission, her cervix was   not favorable and so she received a dose of Cytotec 25 mcg per vagina.  Soon   after she began having very frequent uterine contractions (she was having   uterine contractions with a frequency of about 1 contraction every minute).    She was given a fluid bolus of 1 liter of IV fluid and a dose of terbutaline   because of concern of uterine hyperstimulation.  Her contractions then became   somewhat less frequent, but still remained frequent.  The fetal heart tracing   remained category 1.  In the morning, the patient was still having   contractions.  At about 20 minutes after 1:00 in the afternoon, I placed a   Cook double balloon cervical ripening catheter.  At that time, her cervix was   about 1-2 cm dilated, 50% effaced and -1 to -2 station and I at that time   placed a Cook double balloon cervical ripening catheter.  Then, at about 8:00   or 8:30 in the evening, the balloons were deflated and I checked her cervix   and found her cervix to about 5 cm dilated, 80% effaced, and -2 station and I   at that time performed artificial rupture of membranes and clear amniotic   fluid was observed and an intrauterine pressure catheter was then placed at   that time.  The patient's labor progressed.  It should be noted that after I   inserted the Cook double balloon cervical ripening catheter, she did receive a   labor epidural in the afternoon and her labor epidural functioned well.  She   achieved complete cervical dilation late in the evening and started pushing.    She then went on to have a normal spontaneous  vaginal delivery at 40 weeks and   2 days gestation, on Wednesday, 2018, at about 1:00 in the morning, and   she was delivered of a live term male  with Apgar scores of 6 and 8 at   1 and 5 minutes respectively and a  weight of 3475 grams.  Baby was   delivered over midline vaginal/perineal laceration, which could perhaps be   compared to a first-degree episiotomy, under continuous epidural anesthesia.    It should be noted that a tight nuchal cord x3 was noted at the perineum.    Baby was delivered through this.  The placenta was simply delivered and   examined and found to be complete.  Next, examination of the vulva and vagina   revealed a midline vaginal size perineal laceration, which could perhaps be   compared to a first-degree episiotomy and this laceration was repaired in   layers in the usual fashion using a 3-0 chromic.  Next, a bimanual vaginal   exam was performed and revealed that the uterus was firm and that there were   no gauze sponges in the vagina.  Next, a digital rectal exam was performed and   revealed that the anal sphincter was circumferentially intact and that the   rectal mucosa was intact and that there were no sutures in the rectum.    ESTIMATED BLOOD LOSS:  Approximately 200 mL.       ____________________________________     Tl Howard MD    MED / NTS    DD:  2018 01:47:13  DT:  2018 04:27:16    D#:  [id number]  Job#:  [id number]    cc: AARON LUNSFORD MD, ANYA FELDMAN MD, ROYAL GALEAS MD

## 2018-12-06 VITALS
BODY MASS INDEX: 30.55 KG/M2 | TEMPERATURE: 97.3 F | HEART RATE: 95 BPM | OXYGEN SATURATION: 96 % | HEIGHT: 62 IN | WEIGHT: 166 LBS | SYSTOLIC BLOOD PRESSURE: 93 MMHG | RESPIRATION RATE: 20 BRPM | DIASTOLIC BLOOD PRESSURE: 68 MMHG

## 2018-12-06 PROCEDURE — A9270 NON-COVERED ITEM OR SERVICE: HCPCS | Performed by: SPECIALIST

## 2018-12-06 PROCEDURE — 700102 HCHG RX REV CODE 250 W/ 637 OVERRIDE(OP): Performed by: SPECIALIST

## 2018-12-06 RX ORDER — IBUPROFEN 800 MG/1
800 TABLET ORAL EVERY 8 HOURS PRN
Qty: 30 TAB | Refills: 0 | Status: SHIPPED | OUTPATIENT
Start: 2018-12-06 | End: 2019-06-01

## 2018-12-06 RX ORDER — OXYCODONE HYDROCHLORIDE AND ACETAMINOPHEN 5; 325 MG/1; MG/1
1 TABLET ORAL EVERY 6 HOURS PRN
Qty: 28 TAB | Refills: 0 | Status: SHIPPED | OUTPATIENT
Start: 2018-12-06 | End: 2018-12-13

## 2018-12-06 RX ADMIN — IBUPROFEN 800 MG: 800 TABLET, FILM COATED ORAL at 02:17

## 2018-12-06 RX ADMIN — OXYCODONE AND ACETAMINOPHEN 1 TABLET: 5; 325 TABLET ORAL at 02:17

## 2018-12-06 RX ADMIN — IBUPROFEN 600 MG: 600 TABLET, FILM COATED ORAL at 10:24

## 2018-12-06 RX ADMIN — OXYCODONE AND ACETAMINOPHEN 1 TABLET: 5; 325 TABLET ORAL at 08:23

## 2018-12-06 RX ADMIN — OXYCODONE AND ACETAMINOPHEN 1 TABLET: 5; 325 TABLET ORAL at 14:09

## 2018-12-06 RX ADMIN — Medication 1 TABLET: at 04:42

## 2018-12-06 ASSESSMENT — EDINBURGH POSTNATAL DEPRESSION SCALE (EPDS)
I HAVE LOOKED FORWARD WITH ENJOYMENT TO THINGS: AS MUCH AS I EVER DID
THINGS HAVE BEEN GETTING ON TOP OF ME: NO, MOST OF THE TIME I HAVE COPED QUITE WELL
I HAVE BLAMED MYSELF UNNECESSARILY WHEN THINGS WENT WRONG: NOT VERY OFTEN
I HAVE BEEN SO UNHAPPY THAT I HAVE HAD DIFFICULTY SLEEPING: NOT AT ALL
I HAVE FELT SAD OR MISERABLE: NO, NOT AT ALL
I HAVE FELT SCARED OR PANICKY FOR NO GOOD REASON: NO, NOT AT ALL
I HAVE BEEN ABLE TO LAUGH AND SEE THE FUNNY SIDE OF THINGS: AS MUCH AS I ALWAYS COULD
I HAVE BEEN ANXIOUS OR WORRIED FOR NO GOOD REASON: YES, SOMETIMES
I HAVE BEEN SO UNHAPPY THAT I HAVE BEEN CRYING: NO, NEVER
THE THOUGHT OF HARMING MYSELF HAS OCCURRED TO ME: NEVER

## 2018-12-06 ASSESSMENT — PAIN SCALES - GENERAL
PAINLEVEL_OUTOF10: 7
PAINLEVEL_OUTOF10: 6
PAINLEVEL_OUTOF10: 3
PAINLEVEL_OUTOF10: 3

## 2018-12-06 NOTE — CARE PLAN
Problem: Altered physiologic condition related to immediate post-delivery state and potential for bleeding/hemorrhage  Goal: Patient physiologically stable as evidenced by normal lochia, palpable uterine involution and vital signs within normal limits    Intervention: Perform physical assessment and obtain vital signs on patient as directed in Intrapartum/Postpartum Standard of Care in Policy and Procedure manual  Assessment complete, VSS, fundus firm, lochia light.

## 2018-12-06 NOTE — DISCHARGE INSTRUCTIONS
POSTPARTUM DISCHARGE INSTRUCTIONS FOR MOM    YOB: 1990   Age: 28 y.o.               Admit Date: 12/3/2018     Discharge Date: 12/6/2018  Attending Doctor:  Tl Howard M.D.                  Allergies:  Codeine; Tramadol; and Vicodin [hydrocodone-acetaminophen]    Discharged to home by car. Discharged via wheelchair, hospital escort: Yes.  Special equipment needed: Not Applicable  Belongings with: Personal  Be sure to schedule a follow-up appointment with your primary care doctor or any specialists as instructed.     Discharge Plan:   Diet Plan: Discussed  Activity Level: Discussed  Confirmed Follow up Appointment: Patient to Call and Schedule Appointment  Confirmed Symptoms Management: Discussed  Medication Reconciliation Updated: Yes  Influenza Vaccine Indication: Patient Refuses    REASONS TO CALL YOUR OBSTETRICIAN:  1.   Persistent fever or shaking chills (Temperature higher than 100.4)  2.   Heavy bleeding (soaking more than 1 pad per hour); Passing clots  3.   Foul odor from vagina  4.   Mastitis (Breast infection; breast pain, chills, fever, redness)  5.   Urinary pain, burning or frequency  6.   Episiotomy infection  7.   Severe depression longer than 24 hours    HAND WASHING  · Prior to handling the baby.  · Before breastfeeding or bottle feeding baby.  · After using the bathroom or changing the baby's diaper.    VAGINAL CARE  · Nothing inside vagina for 6 weeks: no sexual intercourse, tampons or douching.  · Bleeding may continue for 2-4 weeks.  Amount may vary.    · Call your physician for heavy bleeding which means soaking more than 1 pad per hour    BIRTH CONTROL  · It is possible to become pregnant at any time after delivery and while breastfeeding.  · Plan to discuss a method of birth control with your physician at your follow up visit. visit.    DIET AND ELIMINATION  · Eating more fiber (bran cereal, fruits, and vegetables) and drinking plenty of fluids will help to avoid  "constipation.  · Urinary frequency after childbirth is normal.    POSTPARTUM BLUES  During the first few days after birth, you may experience a sense of the \"blues\" which may include impatience, irritability or even crying.  These feeling come and go quickly.  However, as many as 1 in 10 women experience emotional symptoms known as postpartum depression.    Postpartum depression:  May start as early as the second or third day after delivery or take several weeks or months to develop.  Symptoms of \"blues\" are present, but are more intense:  Crying spells; loss of appetite; feelings of hopelessness or loss of control; fear of touching the baby; over concern or no concern at all about the baby; little or no concern about your own appearance/caring for yourself; and/or inability to sleep or excessive sleeping.  Contact your physician if you are experiencing any of these symptoms.    Crisis Hotline:  · [place] Crisis Hotline:  6-492-XDHZXKC  Or 1-428.377.7071  · [place] Crisis Hotline:  1-236.685.3502  Or 598-293-6174    PREVENTING SHAKEN BABY:  If you are angry or stressed, PUT THE BABY IN THE CRIB, step away, take some deep breaths, and wait until you are calm to care for the baby.  DO NOT SHAKE THE BABY.  You are not alone, call a supporter for help.    · Crisis Call Center 24/7 crisis line 224-781-3292 or 1-254.429.9108  · You can also text them, text \"ANSWER\" to 286766    QUIT SMOKING/TOBACCO USE:  I understand the use of any tobacco products increases my chance of suffering from future heart disease and could cause other illnesses which may shorten my life. Quitting the use of tobacco products is the single most important thing I can do to improve my health. For further information on smoking / tobacco cessation call a Toll Free Quit Line at 1-568.222.8113 (*[employer] [place]) or 1-510.206.4303 (American Lung Association) or you can access the web based program at www.lungusa.org.    · [place] Tobacco Users " Help Line:  (483) 274-9232       Toll Free: 1-116.549.9112  · Quit Tobacco Program [facility] (010)947-8280    DEPRESSION / SUICIDE RISK:  As you are discharged from this [facility], it is important to learn how to keep safe from harming yourself.    Recognize the warning signs:  · Abrupt changes in personality, positive or negative- including increase in energy   · Giving away possessions  · Change in eating patterns- significant weight changes-  positive or negative  · Change in sleeping patterns- unable to sleep or sleeping all the time   · Unwillingness or inability to communicate  · Depression  · Unusual sadness, discouragement and loneliness  · Talk of wanting to die  · Neglect of personal appearance   · Rebelliousness- reckless behavior  · Withdrawal from people/activities they love  · Confusion- inability to concentrate     If you or a loved one observes any of these behaviors or has concerns about self-harm, here's what you can do:  · Talk about it- your feelings and reasons for harming yourself  · Remove any means that you might use to hurt yourself (examples: pills, rope, extension cords, firearm)  · Get professional help from the community (Mental Health, Substance Abuse, psychological counseling)  · Do not be alone:Call your Safe Contact- someone whom you trust who will be there for you.  · Call your local CRISIS HOTLINE 959-9425 or 904-237-3613  · Call your local Children's Mobile Crisis Response Team [place] (809) 597-2901 or www.Smallaa  · Call the toll free National Suicide Prevention Hotlines   · National Suicide Prevention Lifeline 518-043-ERFP (6027)  · National Hope Line Network 800-SUICIDE (435-7398)    DISCHARGE SURVEY:  Thank you for choosing [facility].  We hope we provided you with very good care.  You may be receiving a survey in the mail.  Please fill it out.  Your opinion is valuable to us.    ADDITIONAL EDUCATIONAL MATERIALS GIVEN TO  PATIENT:        My signature on this form indicates that:  1.  I have reviewed and understand the above information  2.  My questions regarding this information have been answered to my satisfaction.  3.  I have formulated a plan with my discharge nurse to obtain my prescribed medication for home.

## 2018-12-06 NOTE — PROGRESS NOTES
POST PARTUM DAY # 1  The patient complains of cramping pain.   She says that she feels fine other wise and she says that she has no other problems or complaints.   She says that she would like to go home today.   The patient's vital signs are stable and she is afebrile.   She appears well developed and well nourished and relaxed and alert and comfortable and in no apparent distress.   Labs: the patient's hemoglobin went from 13.2 grams per deciliter antepartum to 11.4 grams per deciliter post partum.   We will plan on discharge home later today.   Rx's for percocet and ibuprofen.   Follow up in the office in about 6 weeks for a post partum visit.   Tl Howard M.D.

## 2018-12-06 NOTE — PROGRESS NOTES
Discharged pt awake and alert in wheelchair with escort as ordered after car seat was inspected for fit

## 2019-03-08 ENCOUNTER — OFFICE VISIT (OUTPATIENT)
Dept: URGENT CARE | Facility: CLINIC | Age: 29
End: 2019-03-08
Payer: COMMERCIAL

## 2019-03-08 VITALS
TEMPERATURE: 97 F | OXYGEN SATURATION: 96 % | SYSTOLIC BLOOD PRESSURE: 120 MMHG | DIASTOLIC BLOOD PRESSURE: 70 MMHG | HEIGHT: 62 IN | WEIGHT: 166 LBS | BODY MASS INDEX: 30.55 KG/M2 | RESPIRATION RATE: 16 BRPM | HEART RATE: 88 BPM

## 2019-03-08 DIAGNOSIS — R05.9 COUGH: ICD-10-CM

## 2019-03-08 DIAGNOSIS — J20.9 ACUTE BRONCHITIS, UNSPECIFIED ORGANISM: ICD-10-CM

## 2019-03-08 DIAGNOSIS — R19.7 DIARRHEA, UNSPECIFIED TYPE: ICD-10-CM

## 2019-03-08 PROCEDURE — 99204 OFFICE O/P NEW MOD 45 MIN: CPT | Performed by: NURSE PRACTITIONER

## 2019-03-08 RX ORDER — AZITHROMYCIN 250 MG/1
TABLET, FILM COATED ORAL
Qty: 6 TAB | Refills: 0 | Status: SHIPPED | OUTPATIENT
Start: 2019-03-08 | End: 2019-06-01

## 2019-03-08 ASSESSMENT — ENCOUNTER SYMPTOMS
MYALGIAS: 1
NAUSEA: 0
COUGH: 1
DIZZINESS: 0
ABDOMINAL PAIN: 0
EYE PAIN: 0
SORE THROAT: 0
DIARRHEA: 1
WHEEZING: 0
CHILLS: 1
VOMITING: 0
HEADACHES: 0
SHORTNESS OF BREATH: 0
FEVER: 0
SWEATS: 0
RHINORRHEA: 1

## 2019-03-08 NOTE — PROGRESS NOTES
Subjective:     Osiris Oates is a 28 y.o. female who presents for Cough (chest hurts going towards the back,distruptive sleep, throat hurts from coughing so much x 7 days) and Diarrhea (x 7 days)        Cough    This is a new problem. The current episode started in the past 7 days. The problem has been unchanged. The problem occurs constantly. The cough is productive of sputum. Associated symptoms include chills, myalgias, nasal congestion, postnasal drip and rhinorrhea. Pertinent negatives include no chest pain, fever, headaches, rash, sore throat, shortness of breath, sweats or wheezing.  Nothing aggravates the symptoms. She has tried nothing for the symptoms. The treatment provided no relief. Her past medical history is significant for bronchitis.    Diarrhea     This is a new problem. The current episode started in the past 7 days. The problem occurs 2 to 4 times per day. The problem has been unchanged. The stool consistency is described as watery. Associated symptoms include chills, coughing and myalgias. Pertinent negatives include no abdominal pain, fever, headaches, sweats or vomiting.  Nothing aggravates the symptoms. There are no known risk factors. She has tried nothing for the symptoms. The treatment provided no relief. There is no history of irritable bowel syndrome.   Patient is currently nursing.  Past Medical History:   Diagnosis Date   • BELLE positive    • Anxiety    • Endometriosis    • Endometriosis    • Endometriosis    • Gastroparesis    • Interstitial cystitis    • Seizure disorder (HCC) 2016     Past Surgical History:   Procedure Laterality Date   • APPENDECTOMY     • LAPAROSCOPIC DESTRUCTION OF ENDOMETRIOSIS     • OTHER ABDOMINAL SURGERY     • TONSILLECTOMY       Social History     Social History   • Marital status: Single     Spouse name: N/A   • Number of children: N/A   • Years of education: N/A     Occupational History   • Not on file.     Social History Main Topics   • Smoking  "status: Former Smoker     Years: 3.00     Types: Cigarettes     Quit date: 8/29/2014   • Smokeless tobacco: Never Used   • Alcohol use No   • Drug use: Yes      Comment: marijuana   • Sexual activity: Not on file     Other Topics Concern   • Not on file     Social History Narrative   • No narrative on file      Family History   Problem Relation Age of Onset   • Other Mother         Wilsons Disease    Review of Systems   Constitutional: Positive for chills and malaise/fatigue. Negative for fever.   HENT: Positive for congestion, postnasal drip and rhinorrhea. Negative for sore throat.    Eyes: Negative for pain.   Respiratory: Positive for cough. Negative for shortness of breath and wheezing.    Cardiovascular: Negative for chest pain.   Gastrointestinal: Positive for diarrhea. Negative for abdominal pain, nausea and vomiting.   Genitourinary: Negative for hematuria.   Musculoskeletal: Positive for myalgias.   Skin: Negative for rash.   Neurological: Negative for dizziness and headaches.     Allergies   Allergen Reactions   • Codeine      Gives nausea   • Tramadol Anaphylaxis   • Vicodin [Hydrocodone-Acetaminophen] Anaphylaxis      Objective:   /70   Pulse 88   Temp 36.1 °C (97 °F)   Resp 16   Ht 1.575 m (5' 2\")   Wt 75.3 kg (166 lb)   SpO2 96%   BMI 30.36 kg/m²    Physical Exam   Constitutional: She is oriented to person, place, and time. She appears well-developed and well-nourished. No distress.   HENT:   Head: Normocephalic and atraumatic.   Right Ear: Tympanic membrane normal.   Left Ear: Tympanic membrane normal.   Nose: Nose normal. Right sinus exhibits no maxillary sinus tenderness and no frontal sinus tenderness. Left sinus exhibits no maxillary sinus tenderness and no frontal sinus tenderness.   Mouth/Throat: Uvula is midline, oropharynx is clear and moist and mucous membranes are normal. No posterior oropharyngeal edema, posterior oropharyngeal erythema or tonsillar abscesses. No tonsillar " exudate.   Eyes: Pupils are equal, round, and reactive to light. Conjunctivae and EOM are normal. Right eye exhibits no discharge. Left eye exhibits no discharge.   Cardiovascular: Normal rate and regular rhythm.    No murmur heard.  Pulmonary/Chest: Effort normal and breath sounds normal. No respiratory distress.   Abdominal: Soft. Bowel sounds are normal. She exhibits no distension. There is no hepatosplenomegaly. There is no tenderness. There is no rigidity, no rebound, no guarding, no tenderness at McBurney's point and negative Wu's sign.   Lymphadenopathy:     She has no cervical adenopathy.   Neurological: She is alert and oriented to person, place, and time. She has normal reflexes. No sensory deficit.   Skin: Skin is warm, dry and intact.   Psychiatric: She has a normal mood and affect.         Assessment/Plan:   Assessment    1. Acute bronchitis, unspecified organism  azithromycin (ZITHROMAX) 250 MG Tab   2. Cough     3. Diarrhea, unspecified type       Patient without fever, abdomen nontender to palpation.  Encourage patient to continue with Tylenol, increase fluid and electrolytes.  No infectious etiology suspected as patient has not recently traveled, no contaminated foods.  We will not check stool cultures at this time.  Patient does have a 12-week at home and concerned for infection.   Contingent antibiotic prescription given to patient to fill upon meeting criteria of guidelines discussed.   Differential diagnosis, natural history, supportive care, and indications for immediate follow-up discussed.

## 2019-06-01 ENCOUNTER — HOSPITAL ENCOUNTER (EMERGENCY)
Facility: MEDICAL CENTER | Age: 29
End: 2019-06-01
Attending: EMERGENCY MEDICINE
Payer: COMMERCIAL

## 2019-06-01 ENCOUNTER — APPOINTMENT (OUTPATIENT)
Dept: RADIOLOGY | Facility: MEDICAL CENTER | Age: 29
End: 2019-06-01
Attending: EMERGENCY MEDICINE
Payer: COMMERCIAL

## 2019-06-01 VITALS
DIASTOLIC BLOOD PRESSURE: 64 MMHG | OXYGEN SATURATION: 95 % | HEART RATE: 70 BPM | TEMPERATURE: 97.5 F | RESPIRATION RATE: 17 BRPM | BODY MASS INDEX: 22.88 KG/M2 | HEIGHT: 62 IN | SYSTOLIC BLOOD PRESSURE: 97 MMHG | WEIGHT: 124.34 LBS

## 2019-06-01 DIAGNOSIS — R10.2 PELVIC PAIN: ICD-10-CM

## 2019-06-01 DIAGNOSIS — N39.0 URINARY TRACT INFECTION WITHOUT HEMATURIA, SITE UNSPECIFIED: ICD-10-CM

## 2019-06-01 DIAGNOSIS — N93.8 DUB (DYSFUNCTIONAL UTERINE BLEEDING): ICD-10-CM

## 2019-06-01 LAB
APPEARANCE UR: CLEAR
BACTERIA #/AREA URNS HPF: ABNORMAL /HPF
BILIRUB UR QL STRIP.AUTO: NEGATIVE
COLOR UR: YELLOW
EPI CELLS #/AREA URNS HPF: ABNORMAL /HPF
GLUCOSE UR STRIP.AUTO-MCNC: NEGATIVE MG/DL
HYALINE CASTS #/AREA URNS LPF: ABNORMAL /LPF
KETONES UR STRIP.AUTO-MCNC: NEGATIVE MG/DL
LEUKOCYTE ESTERASE UR QL STRIP.AUTO: ABNORMAL
MICRO URNS: ABNORMAL
NITRITE UR QL STRIP.AUTO: NEGATIVE
PH UR STRIP.AUTO: 5.5 [PH]
PROT UR QL STRIP: NEGATIVE MG/DL
RBC # URNS HPF: ABNORMAL /HPF
RBC UR QL AUTO: ABNORMAL
SP GR UR STRIP.AUTO: 1.02
UROBILINOGEN UR STRIP.AUTO-MCNC: 0.2 MG/DL
WBC #/AREA URNS HPF: ABNORMAL /HPF

## 2019-06-01 PROCEDURE — 99284 EMERGENCY DEPT VISIT MOD MDM: CPT

## 2019-06-01 PROCEDURE — 81001 URINALYSIS AUTO W/SCOPE: CPT

## 2019-06-01 PROCEDURE — 96372 THER/PROPH/DIAG INJ SC/IM: CPT

## 2019-06-01 PROCEDURE — 76856 US EXAM PELVIC COMPLETE: CPT

## 2019-06-01 PROCEDURE — 700111 HCHG RX REV CODE 636 W/ 250 OVERRIDE (IP): Performed by: EMERGENCY MEDICINE

## 2019-06-01 RX ORDER — KETOROLAC TROMETHAMINE 30 MG/ML
30 INJECTION, SOLUTION INTRAMUSCULAR; INTRAVENOUS ONCE
Status: COMPLETED | OUTPATIENT
Start: 2019-06-01 | End: 2019-06-01

## 2019-06-01 RX ORDER — CEPHALEXIN 500 MG/1
500 CAPSULE ORAL 3 TIMES DAILY
Qty: 15 CAP | Refills: 0 | Status: SHIPPED | OUTPATIENT
Start: 2019-06-01 | End: 2019-06-06

## 2019-06-01 RX ADMIN — KETOROLAC TROMETHAMINE 30 MG: 30 INJECTION, SOLUTION INTRAMUSCULAR; INTRAVENOUS at 10:54

## 2019-06-01 ASSESSMENT — ENCOUNTER SYMPTOMS
FEVER: 0
SHORTNESS OF BREATH: 0
CHILLS: 0
VOMITING: 0
ABDOMINAL PAIN: 1

## 2019-06-01 NOTE — ED NOTES
Pt reports vaginal bleeding x10 days approx 1 wk ago. Pt states she used approx 10 tampons within 24 hrs. VSS at this time. Pt does report lightheadedness and headache intermittently since IUD placement.

## 2019-06-01 NOTE — ED NOTES
Pt discharged home. Explained discharge and medication instructions. Questions and comments addressed. Pt verbalized understanding of instructions. Pt advised to follow-up with OB-GYN or return to ED for any new or worsening of symptoms. Pt is ambulating well and steady on feet. VS stable. Pt's SO at bedside and will be driving pt home.

## 2019-06-01 NOTE — ED TRIAGE NOTES
Pt comes in complaining of intermittent lower pelvic pain for approx 2 months. Pt reporting the pain started after IUD placement

## 2019-06-01 NOTE — ED PROVIDER NOTES
ED Provider Note    Scribed for Mary Kate Grier D.O. by Guille Palomino. 6/1/2019, 9:37 AM.    Primary care provider: Pcp Pt States None  Means of arrival: Walk In  History obtained from: Patient  History limited by: None    CHIEF COMPLAINT  Chief Complaint   Patient presents with   • Pelvic Pain       HPI  Osiris Oates is a 28 y.o. female with a history of endometriosis who presents to the Emergency Department for evaluation of pelvic pain. She states that she has an IUD in place which was inserted in the beginning of March 2019, by Dr. Lee Boothe. She has been bleeding on and off for the last 2 months, and has been having pelvic pain, which feels similar to her endometriosis, with and without her bleeding.  Patient denies have any vaginal bleeding at this time her for the last few days but her pain increased today, prompting her visit here.  She reports some associated dizziness and nausea with her symptoms, however has not been vomiting. Osiris has tried to get in contact with Dr. Howard, however claims that when she calls the number for his office the phone stops ringing and nobody answers. She denies any fevers, chills, dysuria, hematuria, or vaginal discharge. No factors are identified which improve or alleviate her symptoms.    REVIEW OF SYSTEMS  Review of Systems   Constitutional: Negative for chills and fever.   HENT: Negative for congestion.    Respiratory: Negative for shortness of breath.    Cardiovascular: Negative for chest pain.   Gastrointestinal: Positive for abdominal pain. Negative for vomiting.   Genitourinary: Negative for dysuria and hematuria.        Positive: Pelvic pain  Negative: Vaginal discharge   All other systems reviewed and are negative.      PAST MEDICAL HISTORY   has a past medical history of BELLE positive; Anxiety; Endometriosis; Endometriosis; Endometriosis; Gastroparesis; Interstitial cystitis; and Seizure disorder (HCC) (2016).    SURGICAL HISTORY   has a past surgical  "history that includes tonsillectomy; laparoscopic destruction of endometriosis; other abdominal surgery; and appendectomy.    SOCIAL HISTORY  Social History   Substance Use Topics   • Smoking status: Former Smoker     Years: 3.00     Types: Cigarettes     Quit date: 8/29/2014   • Smokeless tobacco: Never Used   • Alcohol use No      History   Drug Use     Comment: marijuana       FAMILY HISTORY  Family History   Problem Relation Age of Onset   • Other Mother         Wilsons Disease       CURRENT MEDICATIONS  Home Medications     Reviewed by Pat Sprague R.N. (Registered Nurse) on 06/01/19 at 0852  Med List Status: Complete   Medication Last Dose Status        Patient Jean Taking any Medications                       ALLERGIES  Allergies   Allergen Reactions   • Codeine      Gives nausea   • Tramadol Anaphylaxis   • Vicodin [Hydrocodone-Acetaminophen] Anaphylaxis       PHYSICAL EXAM  VITAL SIGNS: BP (!) 99/53   Pulse 77   Temp 36.4 °C (97.5 °F) (Temporal)   Resp 17   Ht 1.575 m (5' 2\")   Wt 56.4 kg (124 lb 5.4 oz)   SpO2 97%   BMI 22.74 kg/m²   Vitals reviewed.  Constitutional: Patient is oriented to person, place, and time. Appears well-developed and well-nourished. No distress.    Head: Normocephalic and atraumatic.   Ears: Normal external ears bilaterally.   Mouth/Throat: Oropharynx is clear and moist  Eyes: Conjunctivae are normal. Pupils are equal, round, and reactive to light.   Neck: Normal range of motion. Neck supple.  Cardiovascular: Normal rate, regular rhythm and normal heart sounds.   Pulmonary/Chest: Effort normal and breath sounds normal. No respiratory distress, no wheezes, rhonchi, or rales.   Abdominal: Suprapubic and left lower quadrant pain, Soft. Bowel sounds are normal. No rebound or guarding, or peritoneal signs, no masses. No CVA tenderness.  Musculoskeletal: No edema, left lower back pain.  Neurological: No focal deficits.   Skin: Skin is warm and dry. No erythema. No pallor. "   Psychiatric: Patient has a normal mood and affect.     LABS  Results for orders placed or performed during the hospital encounter of 06/01/19   URINALYSIS,CULTURE IF INDICATED   Result Value Ref Range    Color Yellow     Character Clear     Specific Gravity 1.024 <1.035    Ph 5.5 5.0 - 8.0    Glucose Negative Negative mg/dL    Ketones Negative Negative mg/dL    Protein Negative Negative mg/dL    Bilirubin Negative Negative    Urobilinogen, Urine 0.2 Negative    Nitrite Negative Negative    Leukocyte Esterase Trace (A) Negative    Occult Blood Small (A) Negative    Micro Urine Req Microscopic    URINE MICROSCOPIC (W/UA)   Result Value Ref Range    WBC 5-10 (A) /hpf    RBC 0-2 /hpf    Bacteria Moderate (A) None /hpf    Epithelial Cells Many (A) /hpf    Hyaline Cast 3-5 (A) /lpf     All labs reviewed by me.    Clinical Impression: no acute changes and normal EKG    RADIOLOGY  US-PELVIC COMPLETE (TRANSABDOMINAL/TRANSVAGINAL) (COMBO)   Final Result      1.  IUD in place, appropriately positioned.   2.  Pelvic ultrasound otherwise unremarkable.        The radiologist's interpretation of all radiological studies have been reviewed by me.    COURSE & MEDICAL DECISION MAKING  Pertinent Labs & Imaging studies reviewed. (See chart for details)    9:37 AM - Patient seen and examined at bedside.  Patient is not having any vaginal discharge at this time nor she having any vaginal bleeding but she is having left lower quadrant pelvic pain similar to her endometriosis in the past.  Ordered US-Pelvic complete (transabdomina/transvaginal), UA culture if indicated to evaluate her symptoms.     The differential diagnoses include but are not limited to:  Endometritis, UTI, Pain related to IUD,  DUB. Discussed with the patient that I will plan to order for ultrasound imaging of her abdomen to further evaluate her symptoms. She understands and agrees.    10:31 PM - Patient will be treated with Toradol 30 mg    10:45 AM - Paged   Lee    11:01 AM - I spoke with Dr. Ware, OB, who was on call for Dr. Howard, who will call the office and inform Dr. Howard of the patient and get her a follow up this week.    11:44 AM - Patient was reevaluated who was resting. Informed her that her ultrasound was normal and her IUD is in the correct location. Her urine is positive for infection and thus I will prescribe her antibiotics and made her aware that the infection may be making her pain worse.  She is not having any vaginal bleeding or discharge.  At this time, I feel there is no further emergent work-up necessary.  Finally discussed Dr. Ware, OB plan of care and that she will be able to see Dr. Howard, this coming week. She understands the treatment plan and outpatient care and is comfortable with discharge.    The patient will return for new or worsening symptoms and is stable at the time of discharge.    DISPOSITION:  Patient will be discharged home in stable condition.    FOLLOW UP:  [facility], Emergency Dept  [address] [facility] [id number] [phone]    If symptoms worsen    Tl Howard M.D.  [facility] [address]  [phone]    In 2 days        OUTPATIENT MEDICATIONS:  Discharge Medication List as of 6/1/2019 12:16 PM      START taking these medications    Details   cephALEXin (KEFLEX) 500 MG Cap Take 1 Cap by mouth 3 times a day for 5 days., Disp-15 Cap, R-0, Print Rx Paper               FINAL IMPRESSION  1. Pelvic pain    2. DUB (dysfunctional uterine bleeding)    3. Urinary tract infection without hematuria, site unspecified          Guille STEPHENS (Kina), am scribing for, and in the presence of, Mary Kate Grier D.O..    Electronically signed by: Guille Palomino (Kina), 6/1/2019    Mary Kate STEPHENS D.O. personally performed the services described in this documentation, as scribed by Guille Palomino in my presence, and it is both accurate and complete. C.    The note  accurately reflects work and decisions made by me.  Mary Kate Grier  6/1/2019  3:29 PM

## 2019-06-01 NOTE — DISCHARGE INSTRUCTIONS
The on-call physician for Dr. Howard was contacted from the emergency department.  Dr. Howard was notified you were here.  Please call the office this week to schedule follow-up.

## 2019-11-03 ENCOUNTER — HOSPITAL ENCOUNTER (EMERGENCY)
Facility: MEDICAL CENTER | Age: 29
End: 2019-11-03
Attending: EMERGENCY MEDICINE
Payer: COMMERCIAL

## 2019-11-03 VITALS
SYSTOLIC BLOOD PRESSURE: 134 MMHG | HEIGHT: 62 IN | OXYGEN SATURATION: 98 % | BODY MASS INDEX: 23.21 KG/M2 | TEMPERATURE: 97.2 F | WEIGHT: 126.1 LBS | RESPIRATION RATE: 18 BRPM | HEART RATE: 92 BPM | DIASTOLIC BLOOD PRESSURE: 84 MMHG

## 2019-11-03 DIAGNOSIS — T83.9XXA COMPLICATION OF INTRAUTERINE DEVICE (IUD), UNSPECIFIED COMPLICATION, INITIAL ENCOUNTER (HCC): ICD-10-CM

## 2019-11-03 PROCEDURE — 99284 EMERGENCY DEPT VISIT MOD MDM: CPT

## 2019-11-03 PROCEDURE — 58301 REMOVE INTRAUTERINE DEVICE: CPT

## 2019-11-03 PROCEDURE — 306637 HCHG MISC ORTHO ITEM RC 0274

## 2019-11-03 RX ORDER — LEVONORGESTREL 1.5 MG/1
1.5 TABLET ORAL ONCE
Qty: 2 TAB | Refills: 0 | Status: SHIPPED | OUTPATIENT
Start: 2019-11-03 | End: 2019-11-03

## 2019-11-03 NOTE — ED TRIAGE NOTES
Osiris Oates  Chief Complaint   Patient presents with   • Other     IUD displacement   • Cramping     mild     Pt ambulatory to triage with above complaint.  VSS,  No acute distress   Pt states that she can feel that her IUD is displaced, mild cramping.    Pt returned to lobby, educated on triage process, and to inform staff of any changes or concerns.

## 2019-11-04 NOTE — ED PROVIDER NOTES
ED Provider Note    CHIEF COMPLAINT  Chief Complaint   Patient presents with   • Other     IUD displacement   • Cramping     mild       HPI  Osiris Oates is a 29 y.o. female who presents IUD displacement and cramping.  Patient states that she is here because she like her IUD out she felt that she had intercourse with her  today apparently he felt that he had trauma to his penis apparently he was bleeding she examined herself and felt the copper spring part and wants it out.  She feels partly dislodged associate some abdominal cramping.    REVIEW OF SYSTEMS  General: No fever or chills    GI: Mild cramping no discharge    see above       ALL OTHER SYSTEMS ARE NEGATIVE.    PAST MEDICAL HISTORY  Past Medical History:   Diagnosis Date   • BELLE positive    • Anxiety    • Endometriosis    • Endometriosis    • Endometriosis    • Gastroparesis    • Interstitial cystitis    • Seizure disorder (HCC) 2016       FAMILY HISTORY  Family History   Problem Relation Age of Onset   • Other Mother         Wilsons Disease       SOCIAL HISTORY  Social History     Socioeconomic History   • Marital status: Single     Spouse name: Not on file   • Number of children: Not on file   • Years of education: Not on file   • Highest education level: Not on file   Occupational History   • Not on file   Social Needs   • Financial resource strain: Not on file   • Food insecurity:     Worry: Not on file     Inability: Not on file   • Transportation needs:     Medical: Not on file     Non-medical: Not on file   Tobacco Use   • Smoking status: Former Smoker     Years: 3.00     Types: Cigarettes     Last attempt to quit: 2014     Years since quittin.1   • Smokeless tobacco: Never Used   Substance and Sexual Activity   • Alcohol use: No   • Drug use: Yes     Comment: marijuana   • Sexual activity: Not on file   Lifestyle   • Physical activity:     Days per week: Not on file     Minutes per session: Not on file   • Stress: Not on  "file   Relationships   • Social connections:     Talks on phone: Not on file     Gets together: Not on file     Attends [employer] service: Not on file     Active member of club or organization: Not on file     Attends meetings of clubs or organizations: Not on file     Relationship status: Not on file   • Intimate partner violence:     Fear of current or ex partner: Not on file     Emotionally abused: Not on file     Physically abused: Not on file     Forced sexual activity: Not on file   Other Topics Concern   • Not on file   Social History Narrative   • Not on file       SURGICAL HISTORY  Past Surgical History:   Procedure Laterality Date   • APPENDECTOMY     • LAPAROSCOPIC DESTRUCTION OF ENDOMETRIOSIS     • OTHER ABDOMINAL SURGERY     • TONSILLECTOMY         CURRENT MEDICATIONS  Home Medications     Reviewed by Sandy Castro R.N. (Registered Nurse) on 11/03/19 at 1526  Med List Status: Not Addressed   Medication Last Dose Status        Patient Jean Taking any Medications                       ALLERGIES  Allergies   Allergen Reactions   • Codeine      Gives nausea   • Tramadol Anaphylaxis   • Vicodin [Hydrocodone-Acetaminophen] Anaphylaxis       PHYSICAL EXAM  VITAL SIGNS: /84   Pulse 92   Temp 36.2 °C (97.2 °F) (Temporal)   Resp 18   Ht 1.575 m (5' 2\")   Wt 57.2 kg (126 lb 1.7 oz)   SpO2 98%   BMI 23.06 kg/m²   Constitutional: Well developed, Well nourished, No acute distress, Non-toxic appearance.   HENT: Normocephalic, Atraumatic, Bilateral external ears normal, Oropharynx moist, No oral exudates, Nose normal.   Eyes: PERRLA, EOMI, Conjunctiva normal, No discharge.   Musculoskeletal: Neck is soft and supple  Lymphatic: No cervical lymphadenopathy noted.   Cardiovascular: Normal heart rate, Normal rhythm, No murmurs, No rubs, No gallops.   Pulmonary: Lungs are clear to auscultation bilaterally no wheezes rales or rhonchi Abdomen: Bowel sounds normal, Soft, No tenderness, No masses, No " pulsatile masses.   Genitalia: The IUD appears in place there is a string noted there is no discharge from her office.  There is no dislodgment of the IUD  Skin: Warm, Dry, No erythema, No rash.   Back: No tenderness, No CVA tenderness.     RADIOLOGY/PROCEDURES  IUD removal the patient had gentle traction done by the ring forceps IUD came out there is no significant bleeding noted.    COURSE & MEDICAL DECISION MAKING  Pertinent Labs & Imaging studies reviewed. (See chart for details)  She is concerned about loose IUD confirmed with IVs in place after long discussion she would like it out.  At this point the IUD was removed she was grateful no significant issues were noted she will follow-up with her gynecologist we are going off her Plan B as there is a still a theoretical risk o her getting pregnant.  I reconstruct her OB/GYN if she has any further questions regarding this.  FINAL IMPRESSION  1.  Removal of IUD  2.  Pelvic cramping  3.      Electronically signed by: Jose Min, 11/3/2019 4:29 PM

## 2020-11-05 ENCOUNTER — HOSPITAL ENCOUNTER (EMERGENCY)
Facility: MEDICAL CENTER | Age: 30
End: 2020-11-05
Attending: EMERGENCY MEDICINE | Admitting: EMERGENCY MEDICINE
Payer: COMMERCIAL

## 2020-11-05 VITALS
DIASTOLIC BLOOD PRESSURE: 67 MMHG | HEART RATE: 99 BPM | TEMPERATURE: 98.5 F | WEIGHT: 113.54 LBS | HEIGHT: 62 IN | OXYGEN SATURATION: 96 % | SYSTOLIC BLOOD PRESSURE: 105 MMHG | RESPIRATION RATE: 15 BRPM | BODY MASS INDEX: 20.89 KG/M2

## 2020-11-05 DIAGNOSIS — K08.89 DENTALGIA: ICD-10-CM

## 2020-11-05 LAB
COVID ORDER STATUS COVID19: NORMAL
SARS-COV-2 RNA RESP QL NAA+PROBE: NOTDETECTED
SPECIMEN SOURCE: NORMAL

## 2020-11-05 PROCEDURE — A9270 NON-COVERED ITEM OR SERVICE: HCPCS | Performed by: EMERGENCY MEDICINE

## 2020-11-05 PROCEDURE — U0003 INFECTIOUS AGENT DETECTION BY NUCLEIC ACID (DNA OR RNA); SEVERE ACUTE RESPIRATORY SYNDROME CORONAVIRUS 2 (SARS-COV-2) (CORONAVIRUS DISEASE [COVID-19]), AMPLIFIED PROBE TECHNIQUE, MAKING USE OF HIGH THROUGHPUT TECHNOLOGIES AS DESCRIBED BY CMS-2020-01-R: HCPCS

## 2020-11-05 PROCEDURE — 700102 HCHG RX REV CODE 250 W/ 637 OVERRIDE(OP): Performed by: EMERGENCY MEDICINE

## 2020-11-05 PROCEDURE — C9803 HOPD COVID-19 SPEC COLLECT: HCPCS | Performed by: EMERGENCY MEDICINE

## 2020-11-05 PROCEDURE — 99283 EMERGENCY DEPT VISIT LOW MDM: CPT

## 2020-11-05 RX ORDER — AMOXICILLIN 500 MG/1
500 CAPSULE ORAL 3 TIMES DAILY
Qty: 30 CAP | Refills: 0 | Status: ON HOLD | OUTPATIENT
Start: 2020-11-05 | End: 2021-03-28

## 2020-11-05 RX ORDER — IBUPROFEN 600 MG/1
600 TABLET ORAL ONCE
Status: COMPLETED | OUTPATIENT
Start: 2020-11-05 | End: 2020-11-05

## 2020-11-05 RX ORDER — CHLORHEXIDINE GLUCONATE ORAL RINSE 1.2 MG/ML
15 SOLUTION DENTAL 2 TIMES DAILY
Qty: 60 ML | Refills: 0 | Status: ON HOLD | OUTPATIENT
Start: 2020-11-05 | End: 2021-03-28

## 2020-11-05 RX ORDER — IBUPROFEN 600 MG/1
600 TABLET ORAL EVERY 6 HOURS PRN
Qty: 30 TAB | Refills: 0 | Status: ON HOLD | OUTPATIENT
Start: 2020-11-05 | End: 2021-03-28

## 2020-11-05 RX ORDER — AMOXICILLIN 500 MG/1
500 CAPSULE ORAL ONCE
Status: COMPLETED | OUTPATIENT
Start: 2020-11-05 | End: 2020-11-05

## 2020-11-05 RX ADMIN — AMOXICILLIN 500 MG: 500 CAPSULE ORAL at 16:12

## 2020-11-05 RX ADMIN — IBUPROFEN 600 MG: 600 TABLET, FILM COATED ORAL at 16:12

## 2020-11-05 NOTE — ED TRIAGE NOTES
Osiris Oates  30 y.o.  Chief Complaint   Patient presents with   • Dental Pain     bottom left jaw x 2 weeks; pt had temp of 100.3 last pm w body aches and pain is worsening

## 2020-11-05 NOTE — ED PROVIDER NOTES
ED Provider Note    ED Provider Note    Primary care provider: Pcp Pt States None  Means of arrival: Walk-in  History obtained from: Patient    CHIEF COMPLAINT  Chief Complaint   Patient presents with   • Dental Pain     bottom left jaw x 2 weeks; pt had temp of 100.3 last pm w body aches and pain is worsening     Seen at 3:58 PM.   HPI  Osiris Oates is a 30 y.o. female who presents to the Emergency Department for dentalgia.  The patient has 2 weeks of left lower dental pain.  She called her dentist but apparently the first appointment available is in 2021.  She has been taking Tylenol with transient minimal improvement.  She denies any fevers but feels chills at times.  She also notes some body aches and was noted to be tachycardic at triage.  She denies any nasal congestion, cough or vomiting.  She denies any abdominal pain.  She has had a small amount of diarrhea at home.  She also has a son who has had an acute febrile illness with rhinorrhea for the past week.    REVIEW OF SYSTEMS  See HPI,   Remainder of ROS negative.     PAST MEDICAL HISTORY   has a past medical history of BELLE positive, Anxiety, Endometriosis, Endometriosis, Endometriosis, Gastroparesis, Interstitial cystitis, and Seizure disorder (HCC) (2016).    SURGICAL HISTORY   has a past surgical history that includes tonsillectomy; laparoscopic destruction of endometriosis; other abdominal surgery; and appendectomy.    SOCIAL HISTORY  Social History     Tobacco Use   • Smoking status: Former Smoker     Years: 3.00     Types: Cigarettes     Quit date: 2014     Years since quittin.1   • Smokeless tobacco: Never Used   Substance Use Topics   • Alcohol use: No   • Drug use: Not Currently     Comment: marijuana      Social History     Substance and Sexual Activity   Drug Use Not Currently    Comment: marijuana       FAMILY HISTORY  Family History   Problem Relation Age of Onset   • Other Mother         Wilsons Disease       CURRENT  "MEDICATIONS  Reviewed.  See Encounter Summary.     ALLERGIES  Allergies   Allergen Reactions   • Codeine      Gives nausea   • Tramadol Anaphylaxis   • Vicodin [Hydrocodone-Acetaminophen] Anaphylaxis       PHYSICAL EXAM  VITAL SIGNS: /82   Pulse (!) 118   Temp 36.8 °C (98.3 °F) (Temporal)   Resp 18   Ht 1.575 m (5' 2\")   Wt 51.5 kg (113 lb 8.6 oz)   LMP 10/30/2020 (Exact Date)   SpO2 97%   BMI 20.77 kg/m²   Constitutional: Awake, alert in no apparent distress.  HENT: Normocephalic, Bilateral external ears normal. Nose normal.  No trismus.  The floor the oropharynx is normal, the left mandibular wisdom tooth is partially erupted without any obvious dental caries but there is periapical tenderness.  No facial swelling.  No cervical lymphadenopathy.    Eyes: Conjunctiva normal, non-icteric, EOMI.    Thorax & Lungs: Easy unlabored respirations, Clear to ascultation bilaterally.    Cardiovascular: Regular rate, Regular rhythm, No murmurs, rubs or gallops. Bilateral pulses symmetrical. Borderline tachycardic  Abdomen:  Soft, nontender, nondistended, normal active bowel sounds.   :    Skin: Visualized skin is  Dry, No erythema, No rash.   Musculoskeletal:   No cyanosis, clubbing or edema. No leg asymmetry.   Neurologic: Alert, Grossly non-focal.   Psychiatric: Normal affect, Normal mood  Lymphatic:  No cervical LAD      RADIOLOGY  No orders to display         COURSE & MEDICAL DECISION MAKING  Pertinent Labs & Imaging studies reviewed. (See chart for details)      3:58 PM - Medical record reviewed, patient seen and examined at bedside.    Decision Making:  This is a pleasant well-appearing 30-year-old female who presents with dentalgia as a chief complaint.  She was notably tachycardic on initial arrival, in the examination room heart rate is in the mid to high 90s.  I suspect the patient may have some mild dehydration or possibly a tachycardia from underlying infection.  She does not have any chest pain, " shortness of breath or abdominal pain, do not suspect PE.  No indication for further work-up as the tachycardia resolved without treatment.  I will place her on amoxicillin for her dentalgia.  She may require an oral surgeon to extract the tooth.  There is no sign of deep space abscess currently.    She does note her son has had recent symptoms concerning for viral illness and she herself had a small amount of diarrhea over the past few days, therefore COVID-19 swab was sent prior to discharge.    Discharge Medications:  New Prescriptions    AMOXICILLIN (AMOXIL) 500 MG CAP    Take 1 Cap by mouth 3 times a day.    CHLORHEXIDINE (PERIDEX) 0.12 % SOLUTION    Take 15 mL by mouth 2 times a day.    IBUPROFEN (MOTRIN) 600 MG TAB    Take 1 Tab by mouth every 6 hours as needed.       The patient was discharged home (see d/c instructions) was told to return immediately for any signs or symptoms listed, or any worsening at all.  The patient verbally agreed to the discharge precautions and follow-up plan which is documented in EPIC.    The patient's blood pressure is elevated today. >120/80. I have referred them to primary care for follow up.       FINAL IMPRESSION  1. Dentalgia

## 2020-11-06 NOTE — ED NOTES
Pt given d/c paperwork, including appropriate follow up and RX information. Questions addressed. COVID swab obtained and sent to lab. Pt ambulated out of ER without difficulty. Vitals stable.

## 2021-03-26 ENCOUNTER — APPOINTMENT (OUTPATIENT)
Dept: RADIOLOGY | Facility: MEDICAL CENTER | Age: 31
DRG: 831 | End: 2021-03-26
Payer: COMMERCIAL

## 2021-03-26 ENCOUNTER — APPOINTMENT (OUTPATIENT)
Dept: RADIOLOGY | Facility: MEDICAL CENTER | Age: 31
DRG: 831 | End: 2021-03-26
Attending: STUDENT IN AN ORGANIZED HEALTH CARE EDUCATION/TRAINING PROGRAM
Payer: COMMERCIAL

## 2021-03-26 ENCOUNTER — APPOINTMENT (OUTPATIENT)
Dept: RADIOLOGY | Facility: MEDICAL CENTER | Age: 31
DRG: 831 | End: 2021-03-26
Attending: EMERGENCY MEDICINE
Payer: COMMERCIAL

## 2021-03-26 ENCOUNTER — HOSPITAL ENCOUNTER (INPATIENT)
Facility: MEDICAL CENTER | Age: 31
LOS: 2 days | DRG: 831 | End: 2021-03-28
Attending: EMERGENCY MEDICINE | Admitting: STUDENT IN AN ORGANIZED HEALTH CARE EDUCATION/TRAINING PROGRAM
Payer: COMMERCIAL

## 2021-03-26 PROBLEM — Z3A.08 8 WEEKS GESTATION OF PREGNANCY: Status: ACTIVE | Noted: 2021-03-26

## 2021-03-26 PROBLEM — H53.19 PHOTOPSIA OF RIGHT EYE: Status: ACTIVE | Noted: 2021-03-26

## 2021-03-26 PROBLEM — E87.20 METABOLIC ACIDOSIS, NORMAL ANION GAP (NAG): Status: ACTIVE | Noted: 2021-03-26

## 2021-03-26 PROBLEM — K92.1 HEMATOCHEZIA: Status: ACTIVE | Noted: 2021-03-26

## 2021-03-26 PROBLEM — E87.1 HYPONATREMIA: Status: ACTIVE | Noted: 2021-03-26

## 2021-03-26 LAB
ABO + RH BLD: NORMAL
ABO GROUP BLD: NORMAL
ALBUMIN SERPL BCP-MCNC: 4.3 G/DL (ref 3.2–4.9)
ALBUMIN/GLOB SERPL: 1.8 G/DL
ALP SERPL-CCNC: 43 U/L (ref 30–99)
ALT SERPL-CCNC: 12 U/L (ref 2–50)
ANION GAP SERPL CALC-SCNC: 13 MMOL/L (ref 7–16)
APPEARANCE UR: CLEAR
APTT PPP: 31.9 SEC (ref 24.7–36)
AST SERPL-CCNC: 13 U/L (ref 12–45)
B-HCG SERPL-ACNC: ABNORMAL MIU/ML (ref 0–5)
BASOPHILS # BLD AUTO: 0.2 % (ref 0–1.8)
BASOPHILS # BLD: 0.03 K/UL (ref 0–0.12)
BILIRUB SERPL-MCNC: 0.5 MG/DL (ref 0.1–1.5)
BILIRUB UR QL STRIP.AUTO: NEGATIVE
BLD GP AB SCN SERPL QL: NORMAL
BUN SERPL-MCNC: 10 MG/DL (ref 8–22)
CALCIUM SERPL-MCNC: 9.2 MG/DL (ref 8.5–10.5)
CHLORIDE SERPL-SCNC: 101 MMOL/L (ref 96–112)
CHLORIDE UR-SCNC: 109 MMOL/L
CO2 SERPL-SCNC: 18 MMOL/L (ref 20–33)
COLOR UR: YELLOW
CREAT SERPL-MCNC: 0.48 MG/DL (ref 0.5–1.4)
CRP SERPL HS-MCNC: 0.85 MG/DL (ref 0–0.75)
EOSINOPHIL # BLD AUTO: 0.01 K/UL (ref 0–0.51)
EOSINOPHIL NFR BLD: 0.1 % (ref 0–6.9)
ERYTHROCYTE [DISTWIDTH] IN BLOOD BY AUTOMATED COUNT: 47.6 FL (ref 35.9–50)
ERYTHROCYTE [DISTWIDTH] IN BLOOD BY AUTOMATED COUNT: 51 FL (ref 35.9–50)
ERYTHROCYTE [SEDIMENTATION RATE] IN BLOOD BY WESTERGREN METHOD: 6 MM/HOUR (ref 0–20)
GLOBULIN SER CALC-MCNC: 2.4 G/DL (ref 1.9–3.5)
GLUCOSE SERPL-MCNC: 115 MG/DL (ref 65–99)
GLUCOSE UR STRIP.AUTO-MCNC: NEGATIVE MG/DL
HCT VFR BLD AUTO: 34.1 % (ref 37–47)
HCT VFR BLD AUTO: 37.1 % (ref 37–47)
HGB BLD-MCNC: 11.7 G/DL (ref 12–16)
HGB BLD-MCNC: 13.2 G/DL (ref 12–16)
IMM GRANULOCYTES # BLD AUTO: 0.05 K/UL (ref 0–0.11)
IMM GRANULOCYTES NFR BLD AUTO: 0.3 % (ref 0–0.9)
INR PPP: 1.05 (ref 0.87–1.13)
KETONES UR STRIP.AUTO-MCNC: NEGATIVE MG/DL
LEUKOCYTE ESTERASE UR QL STRIP.AUTO: NEGATIVE
LIPASE SERPL-CCNC: 33 U/L (ref 11–82)
LYMPHOCYTES # BLD AUTO: 1.21 K/UL (ref 1–4.8)
LYMPHOCYTES NFR BLD: 8.1 % (ref 22–41)
MAGNESIUM SERPL-MCNC: 1.9 MG/DL (ref 1.5–2.5)
MCH RBC QN AUTO: 32.5 PG (ref 27–33)
MCH RBC QN AUTO: 32.6 PG (ref 27–33)
MCHC RBC AUTO-ENTMCNC: 34.3 G/DL (ref 33.6–35)
MCHC RBC AUTO-ENTMCNC: 35.6 G/DL (ref 33.6–35)
MCV RBC AUTO: 91.4 FL (ref 81.4–97.8)
MCV RBC AUTO: 95 FL (ref 81.4–97.8)
MICRO URNS: NORMAL
MONOCYTES # BLD AUTO: 0.84 K/UL (ref 0–0.85)
MONOCYTES NFR BLD AUTO: 5.6 % (ref 0–13.4)
NEUTROPHILS # BLD AUTO: 12.85 K/UL (ref 2–7.15)
NEUTROPHILS NFR BLD: 85.7 % (ref 44–72)
NITRITE UR QL STRIP.AUTO: NEGATIVE
NRBC # BLD AUTO: 0 K/UL
NRBC BLD-RTO: 0 /100 WBC
PH UR STRIP.AUTO: 5.5 [PH] (ref 5–8)
PLATELET # BLD AUTO: 191 K/UL (ref 164–446)
PLATELET # BLD AUTO: 224 K/UL (ref 164–446)
PMV BLD AUTO: 9.3 FL (ref 9–12.9)
PMV BLD AUTO: 9.6 FL (ref 9–12.9)
POTASSIUM SERPL-SCNC: 3.8 MMOL/L (ref 3.6–5.5)
POTASSIUM UR-SCNC: 36 MMOL/L
PROT SERPL-MCNC: 6.7 G/DL (ref 6–8.2)
PROT UR QL STRIP: NEGATIVE MG/DL
PROTHROMBIN TIME: 14.1 SEC (ref 12–14.6)
RBC # BLD AUTO: 3.59 M/UL (ref 4.2–5.4)
RBC # BLD AUTO: 4.06 M/UL (ref 4.2–5.4)
RBC UR QL AUTO: NEGATIVE
RH BLD: NORMAL
SARS-COV-2 RNA RESP QL NAA+PROBE: NOTDETECTED
SODIUM SERPL-SCNC: 132 MMOL/L (ref 135–145)
SODIUM UR-SCNC: 173 MMOL/L
SP GR UR STRIP.AUTO: 1.02
SPECIMEN SOURCE: NORMAL
UROBILINOGEN UR STRIP.AUTO-MCNC: 0.2 MG/DL
WBC # BLD AUTO: 13.6 K/UL (ref 4.8–10.8)
WBC # BLD AUTO: 15 K/UL (ref 4.8–10.8)

## 2021-03-26 PROCEDURE — 36415 COLL VENOUS BLD VENIPUNCTURE: CPT

## 2021-03-26 PROCEDURE — 86850 RBC ANTIBODY SCREEN: CPT

## 2021-03-26 PROCEDURE — 82436 ASSAY OF URINE CHLORIDE: CPT

## 2021-03-26 PROCEDURE — 96367 TX/PROPH/DG ADDL SEQ IV INF: CPT

## 2021-03-26 PROCEDURE — 700111 HCHG RX REV CODE 636 W/ 250 OVERRIDE (IP): Performed by: EMERGENCY MEDICINE

## 2021-03-26 PROCEDURE — 80053 COMPREHEN METABOLIC PANEL: CPT

## 2021-03-26 PROCEDURE — 700102 HCHG RX REV CODE 250 W/ 637 OVERRIDE(OP): Performed by: INTERNAL MEDICINE

## 2021-03-26 PROCEDURE — 85652 RBC SED RATE AUTOMATED: CPT

## 2021-03-26 PROCEDURE — 85027 COMPLETE CBC AUTOMATED: CPT | Mod: XU

## 2021-03-26 PROCEDURE — 99285 EMERGENCY DEPT VISIT HI MDM: CPT

## 2021-03-26 PROCEDURE — 700105 HCHG RX REV CODE 258: Performed by: EMERGENCY MEDICINE

## 2021-03-26 PROCEDURE — 96365 THER/PROPH/DIAG IV INF INIT: CPT

## 2021-03-26 PROCEDURE — 71045 X-RAY EXAM CHEST 1 VIEW: CPT

## 2021-03-26 PROCEDURE — 96375 TX/PRO/DX INJ NEW DRUG ADDON: CPT

## 2021-03-26 PROCEDURE — 86900 BLOOD TYPING SEROLOGIC ABO: CPT

## 2021-03-26 PROCEDURE — 99223 1ST HOSP IP/OBS HIGH 75: CPT | Performed by: STUDENT IN AN ORGANIZED HEALTH CARE EDUCATION/TRAINING PROGRAM

## 2021-03-26 PROCEDURE — 81003 URINALYSIS AUTO W/O SCOPE: CPT

## 2021-03-26 PROCEDURE — 83690 ASSAY OF LIPASE: CPT

## 2021-03-26 PROCEDURE — 770006 HCHG ROOM/CARE - MED/SURG/GYN SEMI*

## 2021-03-26 PROCEDURE — 84300 ASSAY OF URINE SODIUM: CPT

## 2021-03-26 PROCEDURE — U0005 INFEC AGEN DETEC AMPLI PROBE: HCPCS

## 2021-03-26 PROCEDURE — A9270 NON-COVERED ITEM OR SERVICE: HCPCS | Performed by: INTERNAL MEDICINE

## 2021-03-26 PROCEDURE — 86140 C-REACTIVE PROTEIN: CPT

## 2021-03-26 PROCEDURE — 86901 BLOOD TYPING SEROLOGIC RH(D): CPT

## 2021-03-26 PROCEDURE — 83735 ASSAY OF MAGNESIUM: CPT

## 2021-03-26 PROCEDURE — 85025 COMPLETE CBC W/AUTO DIFF WBC: CPT

## 2021-03-26 PROCEDURE — 84133 ASSAY OF URINE POTASSIUM: CPT

## 2021-03-26 PROCEDURE — 85730 THROMBOPLASTIN TIME PARTIAL: CPT

## 2021-03-26 PROCEDURE — 84702 CHORIONIC GONADOTROPIN TEST: CPT

## 2021-03-26 PROCEDURE — 85610 PROTHROMBIN TIME: CPT

## 2021-03-26 PROCEDURE — 76700 US EXAM ABDOM COMPLETE: CPT

## 2021-03-26 PROCEDURE — U0003 INFECTIOUS AGENT DETECTION BY NUCLEIC ACID (DNA OR RNA); SEVERE ACUTE RESPIRATORY SYNDROME CORONAVIRUS 2 (SARS-COV-2) (CORONAVIRUS DISEASE [COVID-19]), AMPLIFIED PROBE TECHNIQUE, MAKING USE OF HIGH THROUGHPUT TECHNOLOGIES AS DESCRIBED BY CMS-2020-01-R: HCPCS

## 2021-03-26 PROCEDURE — 700105 HCHG RX REV CODE 258: Performed by: STUDENT IN AN ORGANIZED HEALTH CARE EDUCATION/TRAINING PROGRAM

## 2021-03-26 RX ORDER — ACETAMINOPHEN 325 MG/1
650 TABLET ORAL EVERY 4 HOURS PRN
Status: DISCONTINUED | OUTPATIENT
Start: 2021-03-26 | End: 2021-03-28 | Stop reason: HOSPADM

## 2021-03-26 RX ORDER — AMOXICILLIN 250 MG
2 CAPSULE ORAL 2 TIMES DAILY
Status: DISCONTINUED | OUTPATIENT
Start: 2021-03-26 | End: 2021-03-28 | Stop reason: HOSPADM

## 2021-03-26 RX ORDER — BISACODYL 10 MG
10 SUPPOSITORY, RECTAL RECTAL
Status: DISCONTINUED | OUTPATIENT
Start: 2021-03-26 | End: 2021-03-28 | Stop reason: HOSPADM

## 2021-03-26 RX ORDER — SODIUM CHLORIDE 9 MG/ML
1000 INJECTION, SOLUTION INTRAVENOUS ONCE
Status: COMPLETED | OUTPATIENT
Start: 2021-03-26 | End: 2021-03-26

## 2021-03-26 RX ORDER — SODIUM CHLORIDE, SODIUM LACTATE, POTASSIUM CHLORIDE, CALCIUM CHLORIDE 600; 310; 30; 20 MG/100ML; MG/100ML; MG/100ML; MG/100ML
INJECTION, SOLUTION INTRAVENOUS CONTINUOUS
Status: ACTIVE | OUTPATIENT
Start: 2021-03-26 | End: 2021-03-26

## 2021-03-26 RX ORDER — ONDANSETRON 2 MG/ML
4 INJECTION INTRAMUSCULAR; INTRAVENOUS EVERY 4 HOURS PRN
Status: DISCONTINUED | OUTPATIENT
Start: 2021-03-26 | End: 2021-03-28 | Stop reason: HOSPADM

## 2021-03-26 RX ORDER — ONDANSETRON 4 MG/1
4 TABLET, ORALLY DISINTEGRATING ORAL EVERY 4 HOURS PRN
Status: DISCONTINUED | OUTPATIENT
Start: 2021-03-26 | End: 2021-03-28 | Stop reason: HOSPADM

## 2021-03-26 RX ORDER — MORPHINE SULFATE 4 MG/ML
4 INJECTION, SOLUTION INTRAMUSCULAR; INTRAVENOUS ONCE
Status: COMPLETED | OUTPATIENT
Start: 2021-03-26 | End: 2021-03-26

## 2021-03-26 RX ORDER — LABETALOL HYDROCHLORIDE 5 MG/ML
10 INJECTION, SOLUTION INTRAVENOUS EVERY 4 HOURS PRN
Status: DISCONTINUED | OUTPATIENT
Start: 2021-03-26 | End: 2021-03-28 | Stop reason: HOSPADM

## 2021-03-26 RX ORDER — ACETAMINOPHEN 500 MG
1000 TABLET ORAL ONCE
COMMUNITY

## 2021-03-26 RX ORDER — POLYETHYLENE GLYCOL 3350 17 G/17G
1 POWDER, FOR SOLUTION ORAL
Status: DISCONTINUED | OUTPATIENT
Start: 2021-03-26 | End: 2021-03-28 | Stop reason: HOSPADM

## 2021-03-26 RX ORDER — VITAMIN A ACETATE, BETA CAROTENE, ASCORBIC ACID, CHOLECALCIFEROL, .ALPHA.-TOCOPHEROL ACETATE, DL-, THIAMINE MONONITRATE, RIBOFLAVIN, NIACINAMIDE, PYRIDOXINE HYDROCHLORIDE, FOLIC ACID, CYANOCOBALAMIN, CALCIUM CARBONATE, FERROUS FUMARATE, ZINC OXIDE, CUPRIC OXIDE 3080; 12; 120; 400; 1; 1.84; 3; 20; 22; 920; 25; 200; 27; 10; 2 [IU]/1; UG/1; MG/1; [IU]/1; MG/1; MG/1; MG/1; MG/1; MG/1; [IU]/1; MG/1; MG/1; MG/1; MG/1; MG/1
1 TABLET, FILM COATED ORAL
Status: DISCONTINUED | OUTPATIENT
Start: 2021-03-26 | End: 2021-03-28 | Stop reason: HOSPADM

## 2021-03-26 RX ORDER — ONDANSETRON 2 MG/ML
4 INJECTION INTRAMUSCULAR; INTRAVENOUS ONCE
Status: COMPLETED | OUTPATIENT
Start: 2021-03-26 | End: 2021-03-26

## 2021-03-26 RX ADMIN — PIPERACILLIN AND TAZOBACTAM 4.5 G: 4; .5 INJECTION, POWDER, LYOPHILIZED, FOR SOLUTION INTRAVENOUS; PARENTERAL at 05:22

## 2021-03-26 RX ADMIN — MORPHINE SULFATE 4 MG: 4 INJECTION INTRAVENOUS at 05:03

## 2021-03-26 RX ADMIN — ONDANSETRON 4 MG: 2 INJECTION INTRAMUSCULAR; INTRAVENOUS at 05:03

## 2021-03-26 RX ADMIN — ACETAMINOPHEN 650 MG: 325 TABLET ORAL at 12:21

## 2021-03-26 RX ADMIN — SODIUM CHLORIDE, POTASSIUM CHLORIDE, SODIUM LACTATE AND CALCIUM CHLORIDE: 600; 310; 30; 20 INJECTION, SOLUTION INTRAVENOUS at 08:11

## 2021-03-26 RX ADMIN — ACETAMINOPHEN 650 MG: 325 TABLET ORAL at 16:38

## 2021-03-26 RX ADMIN — SODIUM CHLORIDE 1000 ML: 9 INJECTION, SOLUTION INTRAVENOUS at 05:03

## 2021-03-26 ASSESSMENT — COGNITIVE AND FUNCTIONAL STATUS - GENERAL
DAILY ACTIVITIY SCORE: 24
SUGGESTED CMS G CODE MODIFIER DAILY ACTIVITY: CH
MOBILITY SCORE: 24
SUGGESTED CMS G CODE MODIFIER MOBILITY: CH

## 2021-03-26 ASSESSMENT — ENCOUNTER SYMPTOMS
ABDOMINAL PAIN: 1
DIARRHEA: 0
BACK PAIN: 0
WHEEZING: 0
WEAKNESS: 0
SHORTNESS OF BREATH: 0
HEARTBURN: 0
BRUISES/BLEEDS EASILY: 0
BLOOD IN STOOL: 1
DEPRESSION: 0
INSOMNIA: 0
PALPITATIONS: 0
HEADACHES: 1
FEVER: 0
CONSTIPATION: 0
NAUSEA: 0
NECK PAIN: 0
CHILLS: 0
COUGH: 0
SORE THROAT: 0
BLURRED VISION: 0
DOUBLE VISION: 0
FOCAL WEAKNESS: 0
LOSS OF CONSCIOUSNESS: 0
VOMITING: 0

## 2021-03-26 ASSESSMENT — PAIN DESCRIPTION - PAIN TYPE
TYPE: ACUTE PAIN
TYPE: ACUTE PAIN

## 2021-03-26 NOTE — PROGRESS NOTES
Patient was seen at bedside and chart was reviewed. Please see Dr. Moseley's note for further details.  30-year-old female past medical history of BELLE positive, endometriosis presented 3/26/2021 complains of hematochezia, dull generalized abdominal pain for the past 2 days.  She admits she has history of hemorrhoids.  Not on blood thinners.  Vitals are stable.  Blood work shows white blood cell 15, beta-hCG positive.  Abdominal ultrasound unremarkable.  Patient constipated.  No obvious stool.  Still has obvious hematochezia.  No black stools.  No need for stool studies  Hemoglobin stable.  Slightly medications due to internal hemorrhoids.  Also avoid further tests since patient is pregnant and stable.  Bowel regimen started.  If hgb significantly reduces or patient becomes unstable will consult GI for further evaluation and management.  Continue current regime   Sukh Toscano M.D.

## 2021-03-26 NOTE — PROGRESS NOTES
Assumed care of pt at shift change. Pt is on RA with no signs of acute distress. A&Ox4. Reports 8/10 pain to the abdomen, Pt has no PRN pain medication. MD notified. All comfort measures in place. Call light and personal belongings by bedside. Bed locked and in lowest position. Hourly rounding in place.

## 2021-03-26 NOTE — PROGRESS NOTES
2 RN skin check complete with: Shahana RN.  Devices in place: PIV.  Skin assessed under devices: yes.  Confirmed pressure ulcers found on: N/A.  New potential pressure ulcers noted on: N/A   Wound consult placed: N/A    The following interventions in place: Pt encouraged to make frequent changes of position while in bed, encouraged pt to sit in chair for all meals, pillows in use for support/ positioning.     General skin assessment: C/D/I  Bilateral ears: redness from mask/ blanching

## 2021-03-26 NOTE — ASSESSMENT & PLAN NOTE
It is noted in patient's chart that she has a history of seizure disorder however did not have any seizures.    Initiate seizure precautions  Ophthalmology as OP

## 2021-03-26 NOTE — PROGRESS NOTES
Pt arrived to unit via gurney from ED with transport. Pt ambulated with a steady gait to the hospital bed. Pt oriented to room/ unit.

## 2021-03-26 NOTE — ED PROVIDER NOTES
"ED Provider Note    CHIEF COMPLAINT  Chief Complaint   Patient presents with   • Abdominal Pain   • Bloody Stools       [facility]  Osiris Oates is a 30 y.o. G2, P1 at approximately 8 weeks gestation female who presents with a chief complaint of abdominal pain and bloody stools that started yesterday afternoon.  She notes that she felt dizzy and lightheaded this morning.  She also felt constipated.  She ultimately had a large, hard bowel movement this afternoon which was followed by multiple episodes of bright red bloody diarrhea.  She has abdominal cramping with nausea and nonbloody, nonbilious emesis.  She denies any fevers.  No one with similar symptoms in the home.  She did have a colonoscopy 12 years ago due to \"stomach issues\" while she was taking illicit substances but it did not reveal any acute abnormality.  She denies any current alcohol or drug use.  No dysuria or hematuria.  No vaginal discharge.    REVIEW OF SYSTEMS  See HPI for further details.  Abdominal pain.  Bloody stools.  Diarrhea.  Dizzy.  Lightheaded.  All other systems are negative.     PAST MEDICAL HISTORY   has a past medical history of BELLE positive, Anxiety, Endometriosis, Endometriosis, Endometriosis, Gastroparesis, Interstitial cystitis, and Seizure disorder (HCC) (2016).    SOCIAL HISTORY  Social History     Tobacco Use   • Smoking status: Former Smoker     Years: 3.00     Types: Cigarettes     Quit date: 2014     Years since quittin.5   • Smokeless tobacco: Never Used   Substance and Sexual Activity   • Alcohol use: No   • Drug use: Not Currently     Comment: marijuana   • Sexual activity: Not on file       SURGICAL HISTORY   has a past surgical history that includes tonsillectomy; laparoscopic destruction of endometriosis; other abdominal surgery; and appendectomy.    CURRENT MEDICATIONS  Home Medications     Reviewed by Audrey Dupont R.N. (Registered Nurse) on 21 at 0204  Med List Status: Complete   Medication " "Last Dose Status   amoxicillin (AMOXIL) 500 MG Cap  Active   chlorhexidine (PERIDEX) 0.12 % Solution  Active   ibuprofen (MOTRIN) 600 MG Tab  Active                ALLERGIES  Allergies   Allergen Reactions   • Codeine      Gives nausea   • Tramadol Anaphylaxis   • Vicodin [Hydrocodone-Acetaminophen] Anaphylaxis       PHYSICAL EXAM  VITAL SIGNS: BP (!) 94/58   Pulse 88   Temp 36.9 °C (98.4 °F) (Oral)   Resp 17   Ht 1.575 m (5' 2\")   Wt 56.7 kg (125 lb)   LMP 10/30/2020 (Exact Date)   SpO2 98%   BMI 22.86 kg/m²    Pulse ox interpretation: I interpret this pulse ox as normal.  Constitutional: Alert in no apparent distress.  HENT: No signs of trauma, Bilateral external ears normal, Nose normal. Dry mucous membranes.  Eyes: Pupils are equal and reactive, Conjunctiva normal, Non-icteric.   Neck: Normal range of motion, No tenderness, Supple, No stridor.   Lymphatic: No lymphadenopathy noted.   Cardiovascular: Regular rate and rhythm, no murmurs. Pulses symmetrical.  Thorax & Lungs: Normal breath sounds, No respiratory distress, No wheezing, No chest tenderness.   Abdomen: Bowel sounds normal, Soft, gravid, mild but diffuse tenderness, No masses, No pulsatile masses. No peritoneal signs.  Rectal: Nonthrombosed external hemorrhoid.  Norm blood in the rectal vault.  Skin: Warm, Dry, No erythema, No rash.   Back: Normal alignment.   Extremities: Intact distal pulses, No edema, No tenderness, No cyanosis.  Musculoskeletal: Good range of motion in all major joints. No tenderness to palpation or major deformities noted.   Neurologic: Alert, Normal motor function, Normal sensory function, No focal deficits noted.   Psychiatric: Affect normal, Judgment normal, Mood normal.     DIAGNOSTIC STUDIES / PROCEDURES    LABS  Results for orders placed or performed during the hospital encounter of 03/26/21   CBC WITH DIFFERENTIAL   Result Value Ref Range    WBC 15.0 (H) 4.8 - 10.8 K/uL    RBC 4.06 (L) 4.20 - 5.40 M/uL    Hemoglobin " 13.2 12.0 - 16.0 g/dL    Hematocrit 37.1 37.0 - 47.0 %    MCV 91.4 81.4 - 97.8 fL    MCH 32.5 27.0 - 33.0 pg    MCHC 35.6 (H) 33.6 - 35.0 g/dL    RDW 47.6 35.9 - 50.0 fL    Platelet Count 224 164 - 446 K/uL    MPV 9.3 9.0 - 12.9 fL    Neutrophils-Polys 85.70 (H) 44.00 - 72.00 %    Lymphocytes 8.10 (L) 22.00 - 41.00 %    Monocytes 5.60 0.00 - 13.40 %    Eosinophils 0.10 0.00 - 6.90 %    Basophils 0.20 0.00 - 1.80 %    Immature Granulocytes 0.30 0.00 - 0.90 %    Nucleated RBC 0.00 /100 WBC    Neutrophils (Absolute) 12.85 (H) 2.00 - 7.15 K/uL    Lymphs (Absolute) 1.21 1.00 - 4.80 K/uL    Monos (Absolute) 0.84 0.00 - 0.85 K/uL    Eos (Absolute) 0.01 0.00 - 0.51 K/uL    Baso (Absolute) 0.03 0.00 - 0.12 K/uL    Immature Granulocytes (abs) 0.05 0.00 - 0.11 K/uL    NRBC (Absolute) 0.00 K/uL   COMP METABOLIC PANEL   Result Value Ref Range    Sodium 132 (L) 135 - 145 mmol/L    Potassium 3.8 3.6 - 5.5 mmol/L    Chloride 101 96 - 112 mmol/L    Co2 18 (L) 20 - 33 mmol/L    Anion Gap 13.0 7.0 - 16.0    Glucose 115 (H) 65 - 99 mg/dL    Bun 10 8 - 22 mg/dL    Creatinine 0.48 (L) 0.50 - 1.40 mg/dL    Calcium 9.2 8.5 - 10.5 mg/dL    AST(SGOT) 13 12 - 45 U/L    ALT(SGPT) 12 2 - 50 U/L    Alkaline Phosphatase 43 30 - 99 U/L    Total Bilirubin 0.5 0.1 - 1.5 mg/dL    Albumin 4.3 3.2 - 4.9 g/dL    Total Protein 6.7 6.0 - 8.2 g/dL    Globulin 2.4 1.9 - 3.5 g/dL    A-G Ratio 1.8 g/dL   LIPASE   Result Value Ref Range    Lipase 33 11 - 82 U/L   HCG QUANTITATIVE   Result Value Ref Range    Bhcg 731645.0 (H) 0.0 - 5.0 mIU/mL   URINALYSIS,CULTURE IF INDICATED    Specimen: Urine, Clean Catch   Result Value Ref Range    Color Yellow     Character Clear     Specific Gravity 1.016 <1.035    Ph 5.5 5.0 - 8.0    Glucose Negative Negative mg/dL    Ketones Negative Negative mg/dL    Protein Negative Negative mg/dL    Bilirubin Negative Negative    Urobilinogen, Urine 0.2 Negative    Nitrite Negative Negative    Leukocyte Esterase Negative Negative     Occult Blood Negative Negative    Micro Urine Req see below    COD (ADULT)   Result Value Ref Range    ABO Grouping Only A     Rh Grouping Only POS     Antibody Screen-Cod NEG    PROTHROMBIN TIME   Result Value Ref Range    PT 14.1 12.0 - 14.6 sec    INR 1.05 0.87 - 1.13   APTT   Result Value Ref Range    APTT 31.9 24.7 - 36.0 sec   ABO Rh Confirm   Result Value Ref Range    ABO Rh Confirm A POS    ESTIMATED GFR   Result Value Ref Range    GFR If African American >60 >60 mL/min/1.73 m 2    GFR If Non African American >60 >60 mL/min/1.73 m 2   SARS-CoV-2 PCR (24 hour In-House): Collect [facility] swab in [facility]    Specimen: Nasopharyngeal; Respirate   Result Value Ref Range    SARS-CoV-2 Source NP Swab     SARS-CoV-2 by PCR NotDetected    Magnesium   Result Value Ref Range    Magnesium 1.9 1.5 - 2.5 mg/dL   CRP QUANTITIVE (NON-CARDIAC)   Result Value Ref Range    Stat C-Reactive Protein 0.85 (H) 0.00 - 0.75 mg/dL   URINE SODIUM RANDOM   Result Value Ref Range    Sodium, Urine -per volume 173 mmol/L   URINE POTASSIUM RANDOM   Result Value Ref Range    Potassium 36.0 mmol/L   URINE CHLORIDE RANDOM   Result Value Ref Range    Chloride, Urine-per volume 109 mmol/L   CBC WITHOUT DIFFERENTIAL   Result Value Ref Range    WBC 13.6 (H) 4.8 - 10.8 K/uL    RBC 3.59 (L) 4.20 - 5.40 M/uL    Hemoglobin 11.7 (L) 12.0 - 16.0 g/dL    Hematocrit 34.1 (L) 37.0 - 47.0 %    MCV 95.0 81.4 - 97.8 fL    MCH 32.6 27.0 - 33.0 pg    MCHC 34.3 33.6 - 35.0 g/dL    RDW 51.0 (H) 35.9 - 50.0 fL    Platelet Count 191 164 - 446 K/uL    MPV 9.6 9.0 - 12.9 fL   Sed Rate   Result Value Ref Range    Sed Rate Westergren 6 0 - 20 mm/hour   Basic Metabolic Panel (BMP)   Result Value Ref Range    Sodium 136 135 - 145 mmol/L    Potassium 3.5 (L) 3.6 - 5.5 mmol/L    Chloride 105 96 - 112 mmol/L    Co2 21 20 - 33 mmol/L    Glucose 88 65 - 99 mg/dL    Bun 5 (L) 8 - 22 mg/dL    Creatinine 0.50 0.50 - 1.40 mg/dL    Calcium 8.5 8.5 - 10.5 mg/dL    Anion Gap 10.0 7.0 - 16.0    CBC without Differential   Result Value Ref Range    WBC 13.2 (H) 4.8 - 10.8 K/uL    RBC 3.75 (L) 4.20 - 5.40 M/uL    Hemoglobin 11.9 (L) 12.0 - 16.0 g/dL    Hematocrit 35.9 (L) 37.0 - 47.0 %    MCV 95.7 81.4 - 97.8 fL    MCH 31.7 27.0 - 33.0 pg    MCHC 33.1 (L) 33.6 - 35.0 g/dL    RDW 52.2 (H) 35.9 - 50.0 fL    Platelet Count 213 164 - 446 K/uL    MPV 9.9 9.0 - 12.9 fL   ESTIMATED GFR   Result Value Ref Range    GFR If African American >60 >60 mL/min/1.73 m 2    GFR If Non African American >60 >60 mL/min/1.73 m 2     RADIOLOGY  CXR    COURSE & MEDICAL DECISION MAKING  Pertinent Labs & Imaging studies reviewed. (See chart for details)  This is a 30 year old female at 8 weeks gestation here with abdominal pain and hematochezia. Slightly low blood pressure but otherwise hemodynamically stable. Overall well appearing with dry mucous membranes. Abdomen is soft, mildly but diffusely tender. Rectal exam demonstrates non-thrombosed external hemorrhoid without obvious bleeding. She has anne bright red blood in the rectal vault. Clinically her symptoms are consistent with colitis but because of her pregnancy the risk of CT scan outweighs the benefits. She does have a leukocytosis to 15 with neutrophilic predominance. Metabolic panel reveals hyponatremiat to 132, CO2 of 18, BG of 115. Normal lipase. Started on zosyn for potential infectious etiology. Will benefit from hospitalization for additional workup and management.    Discussed with hospitalist and admitted in guarded condition.    HYDRATION  HYDRATION: Based on the patient's presentation of Dehydration the patient was given IV fluids. IV Hydration was used because oral hydration was not adequate alone. Upon recheck following hydration, the patient was improved.    FINAL IMPRESSION  1. Colitis    Electronically signed by: Anoop Hernandez M.D., 3/26/2021 4:17 AM

## 2021-03-26 NOTE — ED NOTES
Med Rec completed: per patient at bedside  Preferred Pharmacy: [id number]  Allergies:  Allergies   Allergen Reactions   • Tramadol Anaphylaxis     Per pt she took with Vicodin unsure which med caused the anaphylaxis    • Vicodin [Hydrocodone-Acetaminophen] Anaphylaxis     Per pt she took with Tramadol unsure which med caused the anaphylaxis (can take tylenol)   • Codeine Nausea     Gives nausea       No ORAL antibiotics in last 14 days    Home Medications:  Medication Sig Comments   • Prenatal MV-Min-Fe Fum-FA-DHA (PRENATAL 1 PO) Take 1 tablet by mouth every day.    • acetaminophen (TYLENOL) 500 MG Tab Take 1,000 mg by mouth one time.   2 caps = 1000 mg

## 2021-03-26 NOTE — ED TRIAGE NOTES
Osiris Oates  30 y.o. female  Chief Complaint   Patient presents with   • Abdominal Pain   • Bloody Stools     Reports abdominal pain, bloody stools and dizziness all day. Lower abdominal cramping, constant 9/10. 8 weeks pregnant, IUP confirmed. Reporting some constipation today, followed by diarrhea.    Vitals:    03/26/21 0131   BP: 100/63   Pulse: (!) 102   Resp: 17   Temp: 37.4 °C (99.4 °F)   SpO2: 97%        Pt ambulated independently into triage with steady gait, mask in place, pale.  Pt educated on triage process. Pt encouraged to alert staff for any changes.

## 2021-03-26 NOTE — CARE PLAN
Problem: Knowledge Deficit  Goal: Knowledge of disease process/condition, treatment plan, diagnostic tests, and medications will improve  Outcome: PROGRESSING AS EXPECTED  Intervention: Explain information regarding disease process/condition, treatment plan, diagnostic tests, and medications and document in education  Note: POC discussed with pt. All questions answered.      Problem: Pain Management  Goal: Pain level will decrease to patient's comfort goal  Outcome: PROGRESSING AS EXPECTED  Intervention: Follow pain managment plan developed in collaboration with patient and Interdisciplinary Team  Note: Pt assessed for pain regularly and medicated PRN per MAR.

## 2021-03-26 NOTE — ASSESSMENT & PLAN NOTE
She describes bright red blood, when she wipes . No melanotic stool. Constipated   Denies history of von Willebrand, hemophilia, use of anticoagulants  Anticoagulation held  Anemia/hgb stable   Lactated Ringer infusion 100 cc/h  ESR/CRP positive, but they can be positive just because of pregnancy   Less likely Chron/ulcerative ?? Cannot rule out infectious colitis ?? Internal hemorrhoids most likely since bleeding and pain worse on pushing. Pt constipated   3/27- zosyn. Bowel regime. Continue to monitor

## 2021-03-26 NOTE — ASSESSMENT & PLAN NOTE
Mild hyponatremia at 132, negative for seizure activity  Most likely from dehydration.   Continue to monitor

## 2021-03-26 NOTE — H&P
Hospital Medicine History & Physical Note    Date of Service  3/26/2021    Primary Care Physician  Pcp Pt States None    Consultants  None    Code Status  Full Code    Chief Complaint  Chief Complaint   Patient presents with   • Abdominal Pain   • Bloody Stools       History of Presenting Illness  30 y.o. female who presented 3/26/2021 with complaints of hematochezia, dull right upper quadrant/epigastric abdominal pain without radiation, headache with photopsia on right starting within the past 2 days.  Of note, patient states her brother is currently being ruled out for inflammatory bowel disease as he is presumed to have Crohn's disease.  She states this is the first time she is ever had this significant amount of hematochezia. She states she had mild spotting during her first pregnancy and denies history of hemorrhoids.  She denies any vaginal bleeding currently and is 8 weeks pregnant according to patient.  She denies any exacerbating or relieving factors.  She otherwise denies: Substernal chest pain, cough with sputum production, nausea, vomiting, fever, chills, constipation, diarrhea, melena, dysuria, hematuria, incoordination, vertigo, syncope, paresthesias.    Vital signs on presentation were as follows: 99.4, 102, 17, 100/63, 97% room air.    Chest x-ray was performed and revealed no acute cardiopulmonary abnormalities.  Abdominal ultrasound performed and unremarkable without evidence of cholelithiasis.  Covid was tested for negative.  Urinalysis performed and within normal limits.  CBC CBC was performed and reveals neutrophilic predominant leukocytosis with WBC of 15.0, lymphocytopenia and otherwise unremarkable.  CMP was performed and revealed non-anion gap metabolic acidosis and mild hyponatremia of 132 otherwise unremarkable.  Beta-hCG was positive for greater than 100,000.  Magnesium was tested and at level of 1.9.     Patient agrees to be kept n.p.o. with sips with medications.  She agrees to  observation admission for further evaluation of her hematochezia.  She agrees to full CODE STATUS at time of evaluation and is alert and oriented x4.  Patient will be optimized in ED and subsequently transferred to medical gardner floor.      Review of Systems  Review of Systems   Constitutional: Negative for chills and fever.   HENT: Negative for congestion and sore throat.    Eyes: Negative for blurred vision and double vision.        Complains of photopsia on the right side   Respiratory: Negative for cough, shortness of breath and wheezing.    Cardiovascular: Negative for chest pain and palpitations.   Gastrointestinal: Positive for abdominal pain and blood in stool. Negative for constipation, diarrhea, heartburn, melena, nausea and vomiting.   Genitourinary: Negative for dysuria and frequency.   Musculoskeletal: Negative for back pain and neck pain.   Skin: Negative for itching and rash.   Neurological: Positive for headaches. Negative for focal weakness, loss of consciousness and weakness.   Endo/Heme/Allergies: Negative for environmental allergies. Does not bruise/bleed easily.   Psychiatric/Behavioral: Negative for depression. The patient does not have insomnia.        Past Medical History   has a past medical history of BELLE positive, Anxiety, Endometriosis, Endometriosis, Endometriosis, Gastroparesis, Interstitial cystitis, and Seizure disorder (HCC) (2016).    Surgical History   has a past surgical history that includes tonsillectomy; laparoscopic destruction of endometriosis; other abdominal surgery; and appendectomy.     Family History  family history includes Other in her mother.     Social History   reports that she quit smoking about 6 years ago. Her smoking use included cigarettes. She quit after 3.00 years of use. She has never used smokeless tobacco. She reports previous drug use. She reports that she does not drink alcohol.    Allergies  Allergies   Allergen Reactions   • Tramadol Anaphylaxis     Per  pt she took with Vicodin unsure which med caused the anaphylaxis    • Vicodin [Hydrocodone-Acetaminophen] Anaphylaxis     Per pt she took with Tramadol unsure which med caused the anaphylaxis (can take tylenol)   • Codeine Nausea     Gives nausea       Medications  Prior to Admission Medications   Prescriptions Last Dose Informant Patient Reported? Taking?   Prenatal MV-Min-Fe Fum-FA-DHA (PRENATAL 1 PO) 3/25/2021 at 0900 Patient Yes Yes   Sig: Take 1 tablet by mouth every day.   acetaminophen (TYLENOL) 500 MG Tab 3/25/2021 at 1730 Patient Yes Yes   Sig: Take 1,000 mg by mouth one time. 2 caps = 1000 mg   amoxicillin (AMOXIL) 500 MG Cap Not Taking at Completed Patient No No   Sig: Take 1 Cap by mouth 3 times a day.   Patient not taking: Reported on 3/26/2021   chlorhexidine (PERIDEX) 0.12 % Solution Not Taking at Completed Patient No No   Sig: Take 15 mL by mouth 2 times a day.   Patient not taking: Reported on 3/26/2021   ibuprofen (MOTRIN) 600 MG Tab Not Taking at Completed Patient No No   Sig: Take 1 Tab by mouth every 6 hours as needed.   Patient not taking: Reported on 3/26/2021      Facility-Administered Medications: None       Physical Exam  Temp:  [36.6 °C (97.8 °F)-37.4 °C (99.4 °F)] 36.8 °C (98.3 °F)  Pulse:  [] 80  Resp:  [16-18] 18  BP: ()/(58-75) 100/64  SpO2:  [95 %-100 %] 98 %    Physical Exam  Constitutional:       Appearance: Normal appearance.   HENT:      Head: Normocephalic and atraumatic.      Mouth/Throat:      Mouth: Mucous membranes are moist.      Pharynx: Oropharynx is clear. No posterior oropharyngeal erythema.   Eyes:      General: No scleral icterus.     Extraocular Movements: Extraocular movements intact.      Conjunctiva/sclera: Conjunctivae normal.      Pupils: Pupils are equal, round, and reactive to light.   Neck:      Vascular: No carotid bruit.   Cardiovascular:      Rate and Rhythm: Normal rate and regular rhythm.      Pulses: Normal pulses.      Heart sounds: Normal  heart sounds. No murmur. No friction rub. No gallop.    Pulmonary:      Effort: Pulmonary effort is normal.      Breath sounds: Normal breath sounds. No wheezing, rhonchi or rales.   Abdominal:      General: Abdomen is flat. Bowel sounds are normal. There is distension (pregnant).      Palpations: There is no mass.      Tenderness: There is no abdominal tenderness. There is no rebound.   Musculoskeletal:         General: No swelling. Normal range of motion.      Cervical back: Normal range of motion.      Right lower leg: No edema.      Left lower leg: No edema.   Lymphadenopathy:      Cervical: No cervical adenopathy.   Skin:     General: Skin is warm and dry.      Capillary Refill: Capillary refill takes less than 2 seconds.      Findings: No erythema or rash.   Neurological:      General: No focal deficit present.      Mental Status: She is alert and oriented to person, place, and time. Mental status is at baseline.      Cranial Nerves: No cranial nerve deficit.      Sensory: No sensory deficit.      Motor: No weakness.   Psychiatric:         Mood and Affect: Mood normal.         Behavior: Behavior normal.         Laboratory:  Recent Labs     03/26/21  0408   WBC 15.0*   RBC 4.06*   HEMOGLOBIN 13.2   HEMATOCRIT 37.1   MCV 91.4   MCH 32.5   MCHC 35.6*   RDW 47.6   PLATELETCT 224   MPV 9.3     Recent Labs     03/26/21  0408   SODIUM 132*   POTASSIUM 3.8   CHLORIDE 101   CO2 18*   GLUCOSE 115*   BUN 10   CREATININE 0.48*   CALCIUM 9.2     Recent Labs     03/26/21  0408   ALTSGPT 12   ASTSGOT 13   ALKPHOSPHAT 43   TBILIRUBIN 0.5   LIPASE 33   GLUCOSE 115*     Recent Labs     03/26/21  0408   APTT 31.9   INR 1.05     No results for input(s): NTPROBNP in the last 72 hours.      No results for input(s): TROPONINT in the last 72 hours.    Imaging:  US-ABDOMEN COMPLETE SURVEY   Final Result      Unremarkable abdominal ultrasound.         DX-CHEST-PORTABLE (1 VIEW)   Final Result      No acute cardiac or pulmonary  abnormalities are identified.          I have reviewed and interpreted the above chest x-ray and do not appreciate any acute cardiopulmonary abnormalities.    Assessment/Plan:  I anticipate this patient is appropriate for observation status at this time.    * Hematochezia- (present on admission)  Assessment & Plan  Denies history of von Willebrand, hemophilia, use of anticoagulants  Occult stool ordered and pending  Famotidine held secondary to contraindication per pharmacy  Anticoagulation held  Consider GI consultation if occult stool positive for potential endoscopy/capsule endoscopy  CBC is not reveal anemia and will repeat in a.m.  N.p.o. with sips with medications  Lactated Ringer infusion 100 cc/h  ESR/CRP ordered as brother currently being ruled out for Crohn's disease    8 weeks gestation of pregnancy- (present on admission)  Assessment & Plan  Beta-hCG greater than 100,000  Consider OB/GYN consultation for comanagement as patient has complex comorbidities    Photopsia of right eye- (present on admission)  Assessment & Plan  It is noted in patient's chart that she has a history of seizure disorder however did not have any seizures.    Initiate seizure precautions  Consider ophthalmoscopic evaluation    Metabolic acidosis, normal anion gap (NAG)- (present on admission)  Assessment & Plan  Repeat BMP in a.m.  Urine electrolytes to determine urine anion gap  Avoid normal saline    Hyponatremia- (present on admission)  Assessment & Plan  Mild hyponatremia at 132, negative for seizure activity  BMP in a.m.  Urine electrolytes ordered

## 2021-03-26 NOTE — ED NOTES
Updated patient on plan of care, verbalized understanding. IV antibiotics given. Nasal Covid swab sent to lab. Hospitalist at bedside

## 2021-03-27 PROBLEM — D64.9 NORMOCYTIC ANEMIA: Status: ACTIVE | Noted: 2021-03-27

## 2021-03-27 PROBLEM — D72.829 LEUKOCYTOSIS: Status: ACTIVE | Noted: 2021-03-27

## 2021-03-27 LAB
ANION GAP SERPL CALC-SCNC: 10 MMOL/L (ref 7–16)
BUN SERPL-MCNC: 5 MG/DL (ref 8–22)
CALCIUM SERPL-MCNC: 8.5 MG/DL (ref 8.5–10.5)
CHLORIDE SERPL-SCNC: 105 MMOL/L (ref 96–112)
CO2 SERPL-SCNC: 21 MMOL/L (ref 20–33)
CREAT SERPL-MCNC: 0.5 MG/DL (ref 0.5–1.4)
ERYTHROCYTE [DISTWIDTH] IN BLOOD BY AUTOMATED COUNT: 52.2 FL (ref 35.9–50)
GLUCOSE SERPL-MCNC: 88 MG/DL (ref 65–99)
HCT VFR BLD AUTO: 35.9 % (ref 37–47)
HGB BLD-MCNC: 11.9 G/DL (ref 12–16)
MCH RBC QN AUTO: 31.7 PG (ref 27–33)
MCHC RBC AUTO-ENTMCNC: 33.1 G/DL (ref 33.6–35)
MCV RBC AUTO: 95.7 FL (ref 81.4–97.8)
PLATELET # BLD AUTO: 213 K/UL (ref 164–446)
PMV BLD AUTO: 9.9 FL (ref 9–12.9)
POTASSIUM SERPL-SCNC: 3.5 MMOL/L (ref 3.6–5.5)
RBC # BLD AUTO: 3.75 M/UL (ref 4.2–5.4)
SODIUM SERPL-SCNC: 136 MMOL/L (ref 135–145)
WBC # BLD AUTO: 13.2 K/UL (ref 4.8–10.8)

## 2021-03-27 PROCEDURE — 700102 HCHG RX REV CODE 250 W/ 637 OVERRIDE(OP): Performed by: STUDENT IN AN ORGANIZED HEALTH CARE EDUCATION/TRAINING PROGRAM

## 2021-03-27 PROCEDURE — 99233 SBSQ HOSP IP/OBS HIGH 50: CPT | Performed by: INTERNAL MEDICINE

## 2021-03-27 PROCEDURE — A9270 NON-COVERED ITEM OR SERVICE: HCPCS | Performed by: INTERNAL MEDICINE

## 2021-03-27 PROCEDURE — 80048 BASIC METABOLIC PNL TOTAL CA: CPT

## 2021-03-27 PROCEDURE — 85027 COMPLETE CBC AUTOMATED: CPT

## 2021-03-27 PROCEDURE — 770006 HCHG ROOM/CARE - MED/SURG/GYN SEMI*

## 2021-03-27 PROCEDURE — 700111 HCHG RX REV CODE 636 W/ 250 OVERRIDE (IP): Performed by: INTERNAL MEDICINE

## 2021-03-27 PROCEDURE — 87899 AGENT NOS ASSAY W/OPTIC: CPT | Mod: 91

## 2021-03-27 PROCEDURE — 700111 HCHG RX REV CODE 636 W/ 250 OVERRIDE (IP): Performed by: STUDENT IN AN ORGANIZED HEALTH CARE EDUCATION/TRAINING PROGRAM

## 2021-03-27 PROCEDURE — 700102 HCHG RX REV CODE 250 W/ 637 OVERRIDE(OP): Performed by: INTERNAL MEDICINE

## 2021-03-27 PROCEDURE — 87045 FECES CULTURE AEROBIC BACT: CPT

## 2021-03-27 PROCEDURE — A9270 NON-COVERED ITEM OR SERVICE: HCPCS | Performed by: STUDENT IN AN ORGANIZED HEALTH CARE EDUCATION/TRAINING PROGRAM

## 2021-03-27 PROCEDURE — 700105 HCHG RX REV CODE 258: Performed by: INTERNAL MEDICINE

## 2021-03-27 RX ORDER — OXYCODONE HYDROCHLORIDE 5 MG/1
5 TABLET ORAL EVERY 6 HOURS PRN
Status: DISCONTINUED | OUTPATIENT
Start: 2021-03-27 | End: 2021-03-27

## 2021-03-27 RX ORDER — OXYCODONE AND ACETAMINOPHEN 10; 325 MG/1; MG/1
1 TABLET ORAL EVERY 8 HOURS PRN
Status: DISCONTINUED | OUTPATIENT
Start: 2021-03-27 | End: 2021-03-28 | Stop reason: HOSPADM

## 2021-03-27 RX ORDER — POTASSIUM CHLORIDE 20 MEQ/1
40 TABLET, EXTENDED RELEASE ORAL ONCE
Status: COMPLETED | OUTPATIENT
Start: 2021-03-27 | End: 2021-03-27

## 2021-03-27 RX ADMIN — PIPERACILLIN AND TAZOBACTAM 3.38 G: 3; .375 INJECTION, POWDER, LYOPHILIZED, FOR SOLUTION INTRAVENOUS; PARENTERAL at 08:46

## 2021-03-27 RX ADMIN — DOCUSATE SODIUM 50 MG AND SENNOSIDES 8.6 MG 2 TABLET: 8.6; 5 TABLET, FILM COATED ORAL at 05:27

## 2021-03-27 RX ADMIN — DOCUSATE SODIUM 50 MG AND SENNOSIDES 8.6 MG 2 TABLET: 8.6; 5 TABLET, FILM COATED ORAL at 16:34

## 2021-03-27 RX ADMIN — PIPERACILLIN AND TAZOBACTAM 3.38 G: 3; .375 INJECTION, POWDER, LYOPHILIZED, FOR SOLUTION INTRAVENOUS; PARENTERAL at 21:09

## 2021-03-27 RX ADMIN — ONDANSETRON 4 MG: 2 INJECTION INTRAMUSCULAR; INTRAVENOUS at 21:21

## 2021-03-27 RX ADMIN — PRENATAL WITH FERROUS FUM AND FOLIC ACID 1 TABLET: 3080; 920; 120; 400; 22; 1.84; 3; 20; 10; 1; 12; 200; 27; 25; 2 TABLET ORAL at 08:46

## 2021-03-27 RX ADMIN — PIPERACILLIN AND TAZOBACTAM 3.38 G: 3; .375 INJECTION, POWDER, LYOPHILIZED, FOR SOLUTION INTRAVENOUS; PARENTERAL at 11:07

## 2021-03-27 RX ADMIN — OXYCODONE HYDROCHLORIDE AND ACETAMINOPHEN 1 TABLET: 10; 325 TABLET ORAL at 16:34

## 2021-03-27 RX ADMIN — ACETAMINOPHEN 650 MG: 325 TABLET ORAL at 11:14

## 2021-03-27 RX ADMIN — POTASSIUM CHLORIDE 40 MEQ: 1500 TABLET, EXTENDED RELEASE ORAL at 08:46

## 2021-03-27 ASSESSMENT — ENCOUNTER SYMPTOMS
CHILLS: 0
DIARRHEA: 0
FEVER: 0
SHORTNESS OF BREATH: 0
HEADACHES: 0
SORE THROAT: 0
COUGH: 0
BLOOD IN STOOL: 1
PALPITATIONS: 0
ABDOMINAL PAIN: 1
DIZZINESS: 0
NAUSEA: 0
VOMITING: 0
HEARTBURN: 0
CONSTIPATION: 1

## 2021-03-27 ASSESSMENT — PAIN DESCRIPTION - PAIN TYPE: TYPE: ACUTE PAIN

## 2021-03-27 NOTE — ASSESSMENT & PLAN NOTE
Due to hematochezia and part of it diluted   Continue to monitor  Stable   contraindicated for further scope

## 2021-03-27 NOTE — PROGRESS NOTES
[facility] Medicine Daily Progress Note    Date of Service  3/27/2021    Chief Complaint  30 y.o. female admitted 3/26/2021 with lili pain, hematochezia    Hospital Course  30-year-old female past medical history of BELLE positive, endometriosis presented 3/26/2021 complains of hematochezia, dull generalized abdominal pain for the past 2 days.  She admits she has history of hemorrhoids.  Not on blood thinners.  Vitals are stable.  Blood work shows white blood cell 15, beta-hCG positive.  Abdominal ultrasound unremarkable.  Patient constipated.  No obvious stool.  Still has obvious hematochezia.  No black stools.  No occult stool studies since it is obvious positive. Cannot rule out gastritis, colitis vs internal hemorrhoids   Hemoglobin stable  She was started empirically on zosyn 3/27- since WBC still elevated and continued to have abdominal pain with hematochezia       Interval Problem Update  Bleeding is less, abdominal pain is still present. Bleeding and pain is more present when she tries to push. She had little bowel movement. She will consider to try percocet. She had tried that before and no allergy   hgb stable. No need for consult at this time. She is pregnant . She is not candidate for colonoscopy/EGD even for CT scan.   She would like to go home tomorrow     Consultants/Specialty  None     Code Status  Full Code    Disposition  Inpatient     Review of Systems  Review of Systems   Constitutional: Positive for malaise/fatigue. Negative for chills and fever.   HENT: Negative for sore throat.    Respiratory: Negative for cough and shortness of breath.    Cardiovascular: Negative for chest pain, palpitations and leg swelling.   Gastrointestinal: Positive for abdominal pain, blood in stool and constipation. Negative for diarrhea, heartburn, melena, nausea and vomiting.   Genitourinary: Negative for dysuria and urgency.   Neurological: Negative for dizziness and headaches.        Physical Exam  Temp:  [36.4 °C  (97.6 °F)-36.7 °C (98 °F)] 36.4 °C (97.6 °F)  Pulse:  [66-91] 84  Resp:  [16-18] 16  BP: ()/(47-61) 96/57  SpO2:  [95 %-99 %] 98 %    Physical Exam  Vitals and nursing note reviewed.   Constitutional:       General: She is not in acute distress.     Appearance: She is not ill-appearing.   Eyes:      General: No scleral icterus.  Cardiovascular:      Rate and Rhythm: Normal rate.      Heart sounds: No murmur.   Pulmonary:      Effort: Pulmonary effort is normal. No respiratory distress.      Breath sounds: No wheezing or rales.   Abdominal:      General: There is distension.      Palpations: Abdomen is soft.      Tenderness: There is abdominal tenderness. There is no guarding or rebound.   Musculoskeletal:         General: No swelling.   Skin:     Coloration: Skin is not jaundiced.   Neurological:      Mental Status: She is alert and oriented to person, place, and time.      Cranial Nerves: No cranial nerve deficit.   Psychiatric:         Mood and Affect: Mood normal.         Behavior: Behavior normal.         Thought Content: Thought content normal.         Judgment: Judgment normal.         Fluids    Intake/Output Summary (Last 24 hours) at 3/27/2021 1311  Last data filed at 3/26/2021 1800  Gross per 24 hour   Intake 1000 ml   Output --   Net 1000 ml       Laboratory  Recent Labs     03/26/21  0408 03/26/21  1624 03/27/21  0032   WBC 15.0* 13.6* 13.2*   RBC 4.06* 3.59* 3.75*   HEMOGLOBIN 13.2 11.7* 11.9*   HEMATOCRIT 37.1 34.1* 35.9*   MCV 91.4 95.0 95.7   MCH 32.5 32.6 31.7   MCHC 35.6* 34.3 33.1*   RDW 47.6 51.0* 52.2*   PLATELETCT 224 191 213   MPV 9.3 9.6 9.9     Recent Labs     03/26/21  0408 03/27/21  0032   SODIUM 132* 136   POTASSIUM 3.8 3.5*   CHLORIDE 101 105   CO2 18* 21   GLUCOSE 115* 88   BUN 10 5*   CREATININE 0.48* 0.50   CALCIUM 9.2 8.5     Recent Labs     03/26/21  0408   APTT 31.9   INR 1.05               Imaging  US-ABDOMEN COMPLETE SURVEY   Final Result      Unremarkable abdominal  ultrasound.         DX-CHEST-PORTABLE (1 VIEW)   Final Result      No acute cardiac or pulmonary abnormalities are identified.           Assessment/Plan  * Hematochezia- (present on admission)  Assessment & Plan  She describes bright red blood, when she wipes . No melanotic stool. Constipated   Denies history of von Willebrand, hemophilia, use of anticoagulants  Anticoagulation held  Anemia/hgb stable   Lactated Ringer infusion 100 cc/h  ESR/CRP positive, but they can be positive just because of pregnancy   Less likely Chron/ulcerative ?? Cannot rule out infectious colitis ?? Internal hemorrhoids most likely since bleeding and pain worse on pushing. Pt constipated   3/27- zosyn. Bowel regime. Continue to monitor     8 weeks gestation of pregnancy- (present on admission)  Assessment & Plan  Beta-hCG greater than 100,000  She will need to follow as OP with OB/GYN     Photopsia of right eye- (present on admission)  Assessment & Plan  It is noted in patient's chart that she has a history of seizure disorder however did not have any seizures.    Initiate seizure precautions  Ophthalmology as OP    Metabolic acidosis, normal anion gap (NAG)- (present on admission)  Assessment & Plan  3/27- resolved. Continue to monitor. Due to dehydration,     Normocytic anemia  Assessment & Plan  Due to hematochezia and part of it diluted   Continue to monitor  Stable   contraindicated for further scope     Leukocytosis- (present on admission)  Assessment & Plan  Increase neutrophilic count   Cannot rule out infection ?? Vs reactive   3/27- zosyn. Continue to monitor     Hyponatremia- (present on admission)  Assessment & Plan  Mild hyponatremia at 132, negative for seizure activity  Most likely from dehydration.   Continue to monitor          VTE prophylaxis: SCD

## 2021-03-27 NOTE — PROGRESS NOTES
Pt continues to c/o pain in her lower abdomen 8/10 unrelieved by hot pack and a meal (pt thought some of the pain might be due to the fact that she was very hungry). Pt also asking to have a prenatal vitamin added to her MAR. Hospitalist Zac notified, said she would review chart and order something else for pain if she can. Also updated Dr. Frank that the pt was not sure if her OB had been notified of her admit, hospitalist said she would check with admitting MD Dr. Villalobos. Pt states her OB is Dr. Lee Boothe.

## 2021-03-27 NOTE — HOSPITAL COURSE
30-year-old female past medical history of BELLE positive, endometriosis presented 3/26/2021 complains of hematochezia, dull generalized abdominal pain for the past 2 days.  She admits she has history of hemorrhoids.  Not on blood thinners.  Vitals are stable.  Blood work shows white blood cell 15, beta-hCG positive.  Abdominal ultrasound unremarkable.  Patient constipated.  No obvious stool.  Still has obvious hematochezia.  No black stools.  No occult stool studies since it is obvious positive. Cannot rule out gastritis, colitis vs internal hemorrhoids   Hemoglobin stable  She was started empirically on zosyn 3/27- since WBC still elevated and continued to have abdominal pain with hematochezia

## 2021-03-27 NOTE — ASSESSMENT & PLAN NOTE
Increase neutrophilic count   Cannot rule out infection ?? Vs reactive   3/27- zosyn. Continue to monitor

## 2021-03-27 NOTE — CARE PLAN
Problem: Infection  Goal: Will remain free from infection  Outcome: PROGRESSING AS EXPECTED  Intervention: Assess signs and symptoms of infection  Note: Pt started on IV zosyn. Monitoring for any worsening signs of infection.      Problem: Pain Management  Goal: Pain level will decrease to patient's comfort goal  Outcome: PROGRESSING AS EXPECTED  Intervention: Follow pain managment plan developed in collaboration with patient and Interdisciplinary Team  Note: Medicated with PRN Tylenol for pain ad offered heat packs for abdominal pain.

## 2021-03-27 NOTE — PROGRESS NOTES
Assumed care of pt from day RN. Pt is A&Ox4, c/o pain in her lower abdomen 8/10, pt states Tylenol is not working for her pain but is concerned about taking anything else because she is 8 weeks pregnant, hot pack given. Pt stating that she is very hungry and concerned about the baby getting enough nutrition on the clear liquid diet that she was on. Paged hospitalist Dr. Jeter, he reviewed her chart and advanced her diet to regular.

## 2021-03-27 NOTE — CARE PLAN
Problem: Communication  Goal: The ability to communicate needs accurately and effectively will improve  Outcome: PROGRESSING AS EXPECTED  Note: Discussed POC with pt including non-pharmacological interventions for pain, adding a prenatal vitamin to her MAR, and discussed diet. All questions answered at this time.      Problem: Pain Management  Goal: Pain level will decrease to patient's comfort goal  Outcome: PROGRESSING SLOWER THAN EXPECTED  Note: Pt continues to c/o pain 8/10 in her abdomen, unrelieved by Tylenol, hot pack, and food. Unfortunately the pt is limited in pharmacological interventions due to the fact that she is 8 weeks pregnant.

## 2021-03-28 VITALS
TEMPERATURE: 98.9 F | OXYGEN SATURATION: 99 % | HEIGHT: 62 IN | SYSTOLIC BLOOD PRESSURE: 94 MMHG | RESPIRATION RATE: 20 BRPM | WEIGHT: 125 LBS | DIASTOLIC BLOOD PRESSURE: 66 MMHG | HEART RATE: 79 BPM | BODY MASS INDEX: 23 KG/M2

## 2021-03-28 PROBLEM — E87.20 METABOLIC ACIDOSIS, NORMAL ANION GAP (NAG): Status: RESOLVED | Noted: 2021-03-26 | Resolved: 2021-03-28

## 2021-03-28 LAB
ALBUMIN SERPL BCP-MCNC: 4 G/DL (ref 3.2–4.9)
ALBUMIN/GLOB SERPL: 1.7 G/DL
ALP SERPL-CCNC: 43 U/L (ref 30–99)
ALT SERPL-CCNC: 11 U/L (ref 2–50)
ANION GAP SERPL CALC-SCNC: 11 MMOL/L (ref 7–16)
AST SERPL-CCNC: 9 U/L (ref 12–45)
BASOPHILS # BLD AUTO: 0.3 % (ref 0–1.8)
BASOPHILS # BLD: 0.03 K/UL (ref 0–0.12)
BILIRUB SERPL-MCNC: 0.9 MG/DL (ref 0.1–1.5)
BUN SERPL-MCNC: 8 MG/DL (ref 8–22)
CALCIUM SERPL-MCNC: 8.8 MG/DL (ref 8.5–10.5)
CHLORIDE SERPL-SCNC: 100 MMOL/L (ref 96–112)
CO2 SERPL-SCNC: 22 MMOL/L (ref 20–33)
CREAT SERPL-MCNC: 0.65 MG/DL (ref 0.5–1.4)
E COLI SXT1+2 STL IA: NORMAL
EOSINOPHIL # BLD AUTO: 0.01 K/UL (ref 0–0.51)
EOSINOPHIL NFR BLD: 0.1 % (ref 0–6.9)
ERYTHROCYTE [DISTWIDTH] IN BLOOD BY AUTOMATED COUNT: 53.1 FL (ref 35.9–50)
GLOBULIN SER CALC-MCNC: 2.4 G/DL (ref 1.9–3.5)
GLUCOSE SERPL-MCNC: 87 MG/DL (ref 65–99)
HCT VFR BLD AUTO: 38 % (ref 37–47)
HGB BLD-MCNC: 12.6 G/DL (ref 12–16)
IMM GRANULOCYTES # BLD AUTO: 0.05 K/UL (ref 0–0.11)
IMM GRANULOCYTES NFR BLD AUTO: 0.5 % (ref 0–0.9)
LYMPHOCYTES # BLD AUTO: 3.02 K/UL (ref 1–4.8)
LYMPHOCYTES NFR BLD: 29.8 % (ref 22–41)
MCH RBC QN AUTO: 31.8 PG (ref 27–33)
MCHC RBC AUTO-ENTMCNC: 33.2 G/DL (ref 33.6–35)
MCV RBC AUTO: 96 FL (ref 81.4–97.8)
MONOCYTES # BLD AUTO: 0.54 K/UL (ref 0–0.85)
MONOCYTES NFR BLD AUTO: 5.3 % (ref 0–13.4)
NEUTROPHILS # BLD AUTO: 6.48 K/UL (ref 2–7.15)
NEUTROPHILS NFR BLD: 64 % (ref 44–72)
NRBC # BLD AUTO: 0 K/UL
NRBC BLD-RTO: 0 /100 WBC
PLATELET # BLD AUTO: 226 K/UL (ref 164–446)
PMV BLD AUTO: 9.5 FL (ref 9–12.9)
POTASSIUM SERPL-SCNC: 3.7 MMOL/L (ref 3.6–5.5)
PROT SERPL-MCNC: 6.4 G/DL (ref 6–8.2)
RBC # BLD AUTO: 3.96 M/UL (ref 4.2–5.4)
SIGNIFICANT IND 70042: NORMAL
SITE SITE: NORMAL
SODIUM SERPL-SCNC: 133 MMOL/L (ref 135–145)
SOURCE SOURCE: NORMAL
WBC # BLD AUTO: 10.1 K/UL (ref 4.8–10.8)

## 2021-03-28 PROCEDURE — 80053 COMPREHEN METABOLIC PANEL: CPT

## 2021-03-28 PROCEDURE — A9270 NON-COVERED ITEM OR SERVICE: HCPCS | Performed by: STUDENT IN AN ORGANIZED HEALTH CARE EDUCATION/TRAINING PROGRAM

## 2021-03-28 PROCEDURE — 700102 HCHG RX REV CODE 250 W/ 637 OVERRIDE(OP): Performed by: STUDENT IN AN ORGANIZED HEALTH CARE EDUCATION/TRAINING PROGRAM

## 2021-03-28 PROCEDURE — 700102 HCHG RX REV CODE 250 W/ 637 OVERRIDE(OP): Performed by: INTERNAL MEDICINE

## 2021-03-28 PROCEDURE — A9270 NON-COVERED ITEM OR SERVICE: HCPCS | Performed by: INTERNAL MEDICINE

## 2021-03-28 PROCEDURE — 700111 HCHG RX REV CODE 636 W/ 250 OVERRIDE (IP): Performed by: INTERNAL MEDICINE

## 2021-03-28 PROCEDURE — 85025 COMPLETE CBC W/AUTO DIFF WBC: CPT

## 2021-03-28 PROCEDURE — 99239 HOSP IP/OBS DSCHRG MGMT >30: CPT | Performed by: INTERNAL MEDICINE

## 2021-03-28 PROCEDURE — 700105 HCHG RX REV CODE 258: Performed by: INTERNAL MEDICINE

## 2021-03-28 RX ORDER — AMOXICILLIN AND CLAVULANATE POTASSIUM 875; 125 MG/1; MG/1
1 TABLET, FILM COATED ORAL EVERY 12 HOURS
Status: DISCONTINUED | OUTPATIENT
Start: 2021-03-28 | End: 2021-03-28

## 2021-03-28 RX ORDER — AMOXICILLIN AND CLAVULANATE POTASSIUM 875; 125 MG/1; MG/1
1 TABLET, FILM COATED ORAL EVERY 12 HOURS
Status: DISCONTINUED | OUTPATIENT
Start: 2021-03-28 | End: 2021-03-28 | Stop reason: HOSPADM

## 2021-03-28 RX ORDER — AMOXICILLIN AND CLAVULANATE POTASSIUM 875; 125 MG/1; MG/1
1 TABLET, FILM COATED ORAL EVERY 12 HOURS
Qty: 6 TABLET | Refills: 0 | Status: SHIPPED | OUTPATIENT
Start: 2021-03-28 | End: 2021-03-31

## 2021-03-28 RX ADMIN — DOCUSATE SODIUM 50 MG AND SENNOSIDES 8.6 MG 2 TABLET: 8.6; 5 TABLET, FILM COATED ORAL at 05:38

## 2021-03-28 RX ADMIN — PIPERACILLIN AND TAZOBACTAM 3.38 G: 3; .375 INJECTION, POWDER, LYOPHILIZED, FOR SOLUTION INTRAVENOUS; PARENTERAL at 05:38

## 2021-03-28 RX ADMIN — PRENATAL WITH FERROUS FUM AND FOLIC ACID 1 TABLET: 3080; 920; 120; 400; 22; 1.84; 3; 20; 10; 1; 12; 200; 27; 25; 2 TABLET ORAL at 09:43

## 2021-03-28 NOTE — PROGRESS NOTES
Assumed care of pt at shift change. Pt is on RA with no signs of acute distress. A&Ox4. Denies any pain. POC discussed with patient. All comfort measures in place. Call light and personal belongings by bedside. Bed locked and in lowest position. Hourly rounding in place.

## 2021-03-28 NOTE — PROGRESS NOTES
Patient discharged home. All personal belongings collected. IV access removed. Discharge instructions discussed. Medications reviewed; new medication printed education provided and discussed. Follow up appointments to be scheduled by patient at earliest convenience. Patient discharged from discharge lounge without incident.

## 2021-03-28 NOTE — DISCHARGE SUMMARY
Discharge Summary    CHIEF COMPLAINT ON ADMISSION  Chief Complaint   Patient presents with   • Abdominal Pain   • Bloody Stools       Reason for Admission  Abdominal Pain; Diarrhea; Blood in*     Admission Date  3/26/2021    CODE STATUS  Prior    HPI & HOSPITAL COURSE     30-year-old female past medical history of BELLE positive, endometriosis presented 3/26/2021 complains of hematochezia, dull generalized abdominal pain for the past 2 days.  She admits she has history of hemorrhoids.  Not on blood thinners.  Vitals are stable.  Blood work shows white blood cell 15, beta-hCG positive.  Abdominal ultrasound unremarkable.  Patient constipated.  No obvious stool. She did have obvious hematochezia.  No black stools.  No occult stool studies since it is obvious positive. Cannot rule out gastritis, colitis vs internal hemorrhoids   Hemoglobin stable. She is not a candidate for sedation, scope or CT scan since she is pregnant.   She was started empirically on zosyn 3/27- since WBC still elevated and continued to have abdominal pain with hematochezia . 3/28- abdominal pain resolved. WBC improved. She will be discharge on Augmentin for three additional days.   I did advise to continue prune juice, stool softer at home. Proper hydration. She felt great therefore she was discharge home to follow up with OB as outpatient.        Therefore, she is discharged in good and stable condition to home with close outpatient follow-up.    The patient met 2-midnight criteria for an inpatient stay at the time of discharge.    Discharge Date  03/28/2021    FOLLOW UP ITEMS POST DISCHARGE  none    DISCHARGE DIAGNOSES  Principal Problem:    Hematochezia POA: Yes  Active Problems:    Photopsia of right eye POA: Yes    8 weeks gestation of pregnancy POA: Yes    Leukocytosis POA: Yes    Normocytic anemia POA: Yes    Hyponatremia POA: Yes  Resolved Problems:    Metabolic acidosis, normal anion gap (NAG) POA: Yes      FOLLOW UP  No future  appointments.  Please follow up with your primary care provider    In 1 week  hospital follow up      MEDICATIONS ON DISCHARGE     Medication List      START taking these medications      Instructions   amoxicillin-clavulanate 875-125 MG Tabs  Commonly known as: AUGMENTIN   Take 1 tablet by mouth every 12 hours for 3 days.  Dose: 1 tablet        CONTINUE taking these medications      Instructions   acetaminophen 500 MG Tabs  Commonly known as: TYLENOL   Take 1,000 mg by mouth one time. 2 caps = 1000 mg  Dose: 1,000 mg     PRENATAL 1 PO   Take 1 tablet by mouth every day.  Dose: 1 tablet        STOP taking these medications    amoxicillin 500 MG Caps  Commonly known as: AMOXIL     chlorhexidine 0.12 % Soln  Commonly known as: PERIDEX     ibuprofen 600 MG Tabs  Commonly known as: MOTRIN            Allergies  Allergies   Allergen Reactions   • Tramadol Anaphylaxis     Per pt she took with Vicodin unsure which med caused the anaphylaxis    • Vicodin [Hydrocodone-Acetaminophen] Anaphylaxis     Per pt she took with Tramadol unsure which med caused the anaphylaxis (can take tylenol); tolerated Percocet 3/27/21   • Codeine Nausea     Gives nausea       DIET  No orders of the defined types were placed in this encounter.      ACTIVITY  As tolerated.  Weight bearing as tolerated    CONSULTATIONS  None     PROCEDURES  None     LABORATORY  Lab Results   Component Value Date    SODIUM 133 (L) 03/28/2021    POTASSIUM 3.7 03/28/2021    CHLORIDE 100 03/28/2021    CO2 22 03/28/2021    GLUCOSE 87 03/28/2021    BUN 8 03/28/2021    CREATININE 0.65 03/28/2021        Lab Results   Component Value Date    WBC 10.1 03/28/2021    HEMOGLOBIN 12.6 03/28/2021    HEMATOCRIT 38.0 03/28/2021    PLATELETCT 226 03/28/2021        Total time of the discharge process exceeds 35 minutes.

## 2021-03-28 NOTE — PROGRESS NOTES
Assumed care of pt at shift change. Pt is on RA with no signs of acute distress. A&Ox4. Pt reports abdominal pain throughout shift, heat applied and medicated pt with PRN pain medications. Stool sample collected and sent to lab, no visible blood in stool. All comfort measures in place. Call light and personal belongings by bedside. Bed locked and in lowest position. Hourly rounding in place.

## 2021-03-28 NOTE — PROGRESS NOTES
Pt is A&Ox4, sitting up in bed. Pt reports good pain relief with the Percocet earlier 0/10 pain at this time, however experiencing a little nausea, pt given PRN Zofran. Pt's OB came by to see pt, he did not have any concerns in reference to her pregnancy.

## 2021-03-28 NOTE — DISCHARGE INSTRUCTIONS
Discharge Instructions    Discharged to home by car with self. Discharged via wheelchair, hospital escort: Yes.  Special equipment needed: Not Applicable    Be sure to schedule a follow-up appointment with your primary care doctor or any specialists as instructed.     Discharge Plan:   Diet Plan: Discussed  Activity Level: Discussed  Confirmed Follow up Appointment: Patient to Call and Schedule Appointment  Confirmed Symptoms Management: Discussed  Medication Reconciliation Updated: Yes  Influenza Vaccine Indication: Patient Refuses    I understand that a diet low in cholesterol, fat, and sodium is recommended for good health. Unless I have been given specific instructions below for another diet, I accept this instruction as my diet prescription.   Other diet: regular    Special Instructions: None    · Is patient discharged on Warfarin / Coumadin?   No   Amoxicillin; Clavulanic Acid tablets  What is this medicine?  AMOXICILLIN; CLAVULANIC ACID (a mox i JAIRON in; FREIDA gutierrez AS id) is a penicillin antibiotic. It is used to treat certain kinds of bacterial infections. It will not work for colds, flu, or other viral infections.  This medicine may be used for other purposes; ask your health care provider or pharmacist if you have questions.  COMMON BRAND NAME(S): Augmentin  What should I tell my health care provider before I take this medicine?  They need to know if you have any of these conditions:  · bowel disease, like colitis  · kidney disease  · liver disease  · mononucleosis  · an unusual or allergic reaction to amoxicillin, penicillin, cephalosporin, other antibiotics, clavulanic acid, other medicines, foods, dyes, or preservatives  · pregnant or trying to get pregnant  · breast-feeding  How should I use this medicine?  Take this medicine by mouth with a full glass of water. Follow the directions on the prescription label. Take at the start of a meal. Do not crush or chew. If the tablet has a score line, you  may cut it in half at the score line for easier swallowing. Take your medicine at regular intervals. Do not take your medicine more often than directed. Take all of your medicine as directed even if you think you are better. Do not skip doses or stop your medicine early.  Talk to your pediatrician regarding the use of this medicine in children. Special care may be needed.  Overdosage: If you think you have taken too much of this medicine contact a poison control center or emergency room at once.  NOTE: This medicine is only for you. Do not share this medicine with others.  What if I miss a dose?  If you miss a dose, take it as soon as you can. If it is almost time for your next dose, take only that dose. Do not take double or extra doses.  What may interact with this medicine?  · allopurinol  · anticoagulants  · birth control pills  · methotrexate  · probenecid  This list may not describe all possible interactions. Give your health care provider a list of all the medicines, herbs, non-prescription drugs, or dietary supplements you use. Also tell them if you smoke, drink alcohol, or use illegal drugs. Some items may interact with your medicine.  What should I watch for while using this medicine?  Tell your doctor or healthcare provider if your symptoms do not improve.  This medicine may cause serious skin reactions. They can happen weeks to months after starting the medicine. Contact your healthcare provider right away if you notice fevers or flu-like symptoms with a rash. The rash may be red or purple and then turn into blisters or peeling of the skin. Or, you might notice a red rash with swelling of the face, lips or lymph nodes in your neck or under your arms.  Do not treat diarrhea with over the counter products. Contact your doctor if you have diarrhea that lasts more than 2 days or if it is severe and watery.  If you have diabetes, you may get a false-positive result for sugar in your urine. Check with your  doctor or healthcare provider.  Birth control pills may not work properly while you are taking this medicine. Talk to your doctor about using an extra method of birth control.  What side effects may I notice from receiving this medicine?  Side effects that you should report to your doctor or health care professional as soon as possible:  · allergic reactions like skin rash, itching or hives, swelling of the face, lips, or tongue  · breathing problems  · dark urine  · fever or chills, sore throat  · redness, blistering, peeling, or loosening of the skin, including inside the mouth  · seizures  · trouble passing urine or change in the amount of urine  · unusual bleeding, bruising  · unusually weak or tired  · white patches or sores in the mouth or throat  Side effects that usually do not require medical attention (report to your doctor or health care professional if they continue or are bothersome):  · diarrhea  · dizziness  · headache  · nausea, vomiting  · stomach upset  · vaginal or anal irritation  This list may not describe all possible side effects. Call your doctor for medical advice about side effects. You may report side effects to FDA at 3-383-FDA-5473.  Where should I keep my medicine?  Keep out of the reach of children.  Store at room temperature below 25 degrees C (77 degrees F). Keep container tightly closed. Throw away any unused medicine after the expiration date.  NOTE: This sheet is a summary. It may not cover all possible information. If you have questions about this medicine, talk to your doctor, pharmacist, or health care provider.  © 2020 Elsevier/Gold Standard (2020-03-02 09:43:46)      Depression / Suicide Risk    As you are discharged from this [facility] facility, it is important to learn how to keep safe from harming yourself.    Recognize the warning signs:  · Abrupt changes in personality, positive or negative- including increase in energy   · Giving away possessions  · Change in eating  patterns- significant weight changes-  positive or negative  · Change in sleeping patterns- unable to sleep or sleeping all the time   · Unwillingness or inability to communicate  · Depression  · Unusual sadness, discouragement and loneliness  · Talk of wanting to die  · Neglect of personal appearance   · Rebelliousness- reckless behavior  · Withdrawal from people/activities they love  · Confusion- inability to concentrate     If you or a loved one observes any of these behaviors or has concerns about self-harm, here's what you can do:  · Talk about it- your feelings and reasons for harming yourself  · Remove any means that you might use to hurt yourself (examples: pills, rope, extension cords, firearm)  · Get professional help from the community (Mental Health, Substance Abuse, psychological counseling)  · Do not be alone:Call your Safe Contact- someone whom you trust who will be there for you.  · Call your local CRISIS HOTLINE 974-5098 or 421-873-9849  · Call your local Children's Mobile Crisis Response Team [place] (764) 880-3304 or www.Hammerless  · Call the toll free National Suicide Prevention Hotlines   · National Suicide Prevention Lifeline 792-142-IKHA (9307)  · National Hope Line Network 800-SUICIDE (251-0166)

## 2021-03-29 LAB
BACTERIA STL CULT: NORMAL
C JEJUNI+C COLI AG STL QL: NORMAL
E COLI SXT1+2 STL IA: NORMAL
SIGNIFICANT IND 70042: NORMAL
SITE SITE: NORMAL
SOURCE SOURCE: NORMAL

## 2021-03-31 ENCOUNTER — PATIENT OUTREACH (OUTPATIENT)
Dept: HEALTH INFORMATION MANAGEMENT | Facility: OTHER | Age: 31
End: 2021-03-31

## 2021-03-31 NOTE — PROGRESS NOTES
Community Health Worker Juan Miguel attempted to reach the patient to introduce Community Care Management. Patient did not answer and has a voicemail box that is full. CHW will attempt again.

## 2021-04-08 NOTE — PROGRESS NOTES
CHW Vinson called the patient in a third attempt to introduce Community Care Management. CHW was unable to leave a voicemail as the patient's mobile phone mail box was full. CHW then called Eloisa Andrew (Mother) and left a voicemail.     CHW will remove the patient due to lack of engagement.

## 2021-07-14 ENCOUNTER — HOSPITAL ENCOUNTER (EMERGENCY)
Facility: MEDICAL CENTER | Age: 31
End: 2021-07-14
Attending: SPECIALIST | Admitting: SPECIALIST
Payer: COMMERCIAL

## 2021-07-14 VITALS
RESPIRATION RATE: 18 BRPM | HEART RATE: 83 BPM | WEIGHT: 130 LBS | OXYGEN SATURATION: 98 % | BODY MASS INDEX: 22.2 KG/M2 | TEMPERATURE: 97.6 F | HEIGHT: 64 IN | DIASTOLIC BLOOD PRESSURE: 59 MMHG | SYSTOLIC BLOOD PRESSURE: 115 MMHG

## 2021-07-14 LAB — A1 MICROGLOB PLACENTAL VAG QL: NEGATIVE

## 2021-07-14 PROCEDURE — 84112 EVAL AMNIOTIC FLUID PROTEIN: CPT

## 2021-07-14 PROCEDURE — 302449 STATCHG TRIAGE ONLY (STATISTIC)

## 2021-07-14 ASSESSMENT — FIBROSIS 4 INDEX: FIB4 SCORE: 0.35

## 2021-07-15 NOTE — DISCHARGE INSTRUCTIONS
"General Instructions:  · If you think you are in labor, time contractions (lying on your left side) from the beginning of one contraction to the beginning of the next contraction for at least one hour.  · Increase fluid intake: you should consume 10-12 8 oz glasses of non-caffeinated fluid per day.  · Report any pressure or burning on urination to your physician.  · Monitor fetal movement: If you notice an absence or decrease in fetal movement, drink a large glass of water and rest on your side.  If there is no increase in movement, call your physician or go to the hospital for further evaluation.  · Report any sudden, sharp abdominal pain.  · Report any bleeding.  Spotting or pinkish discharge is normal after vaginal exam.  You may also spot after sexual intercourse.    Pre-term Labor (<37 weeks):  Call your physician or return to the hospital if:  · You have painless regular contractions more than 4 in one hour.  · Your water breaks (remember time and color).  · You have menstrual-like cramps, a low dull backache or pressure in your pelvis or back.  · Your baby does not move enough to complete the daily kick count (10 movements in 2 hours).  · Your baby moves much less often than on the days before or you have not felt your baby move all day.  · Please review the MEDICATION LIST section of your AFTER VISIT SUMMARY document.  · Take your medication as prescribed      Premature Rupture and  Premature Rupture of Membranes    A sac made up of membranes surrounds your baby in the womb (uterus). Rupture of membranes is when this sac breaks open. This is also known as your \"water breaking.\" When this sac breaks before labor starts, it is called premature rupture of membranes (PROM). If this happens before 37 weeks of being pregnant, it is called  premature rupture of membranes (PPROM). PPROM is serious. It needs medical care right away.  What increases the risk of PPROM?  PPROM is more likely to happen in " women who:  · Have an infection.  · Have had PPROM before.  · Have a cervix that is short.  · Have bleeding during the second or third trimester.  · Have a low BMI. This is a measure of body fat.  · Smoke.  · Use drugs.  · Have a low socioeconomic status.  What problems can be caused by PROM and PPROM?  This condition creates health dangers for the mother and the baby. These include:  · Giving birth to the baby too early (prematurely).  · Getting a serious infection of the placenta (chorioamnionitis).  · Having the placenta detach from the uterus early (placental abruption).  · Squeezing of the umbilical cord.  · Getting a serious infection after delivery.  What are the signs of PROM and PPROM?  · A sudden gush of fluid from the vagina.  · A slow leak of fluid from the vagina.  · Your underwear is wet.  What should I do if I think my water broke?  Call your doctor right away. You will need to go to the hospital to get checked right away.  What happens if I am told that I have PROM or PPROM?  You will have tests done at the hospital.  · If you have PROM, you may be given medicine to start labor (be induced). This may be done if you are not having contractions during the 24 hours after your water broke.  · If you have PPROM and are not having contractions, you may be given medicine to start labor. It will depend on how far along you are in your pregnancy.  If you have PPROM:  · You and your baby will be watched closely to see if you have infections or other problems.  · You may be given:  ? An antibiotic medicine. This can stop an infection from starting.  ? A steroid medicine. This can help your baby's lungs develop faster.  ? A medicine to help prevent cerebral palsy in your baby.  ? A medicine to stop early labor ( labor).  · You may be told to stay in bed except to use the bathroom (bed rest).  · You may be given medicine to start labor. This may be done if there are problems with you or the baby.  Your  treatment will depend on many factors.  Contact a doctor if:  · Your water breaks and you are not having contractions.  Get help right away if:  · Your water breaks before you are 37 weeks pregnant.  Summary  · When your water breaks before labor starts, it is called premature rupture of membranes (PROM).  · When PROM happens before 37 weeks of pregnancy, it is called  premature rupture of membranes (PPROM).  · If you are not having contractions, your labor may be started for you.  This information is not intended to replace advice given to you by your health care provider. Make sure you discuss any questions you have with your health care provider.  Document Released: 2010 Document Revised: 04/10/2020 Document Reviewed: 2017  Elsevier Patient Education ©  Elsevier Inc.      Other Instructions:  Please carefully review your entire AFTER VISIT SUMMARY document for all discharge instructions.

## 2021-07-15 NOTE — PROGRESS NOTES
: Pt arrived to triage with complaint of two gushes of fluid. Pt if  with EDC 10/31 making her 24&3. Pt denies VB. Pt reports some shelley weiss contractions two days ago and intermittent cramping. EFM/Cherry applied.     Call made to Dr Andrea with update on pt arrival. Order for amnisure collection.     : Call made to Dr Andrea regarding negative amnisure test. Discharge order received.     Discharge instructions discussed with pt. All questions answered.     : Pt off unit, belongings accounted for.

## 2021-08-28 ENCOUNTER — HOSPITAL ENCOUNTER (EMERGENCY)
Facility: MEDICAL CENTER | Age: 31
End: 2021-08-28
Attending: EMERGENCY MEDICINE
Payer: COMMERCIAL

## 2021-08-28 VITALS
HEART RATE: 88 BPM | TEMPERATURE: 98.2 F | OXYGEN SATURATION: 96 % | BODY MASS INDEX: 25.56 KG/M2 | SYSTOLIC BLOOD PRESSURE: 108 MMHG | HEIGHT: 62 IN | RESPIRATION RATE: 18 BRPM | DIASTOLIC BLOOD PRESSURE: 63 MMHG | WEIGHT: 138.89 LBS

## 2021-08-28 DIAGNOSIS — B34.9 ACUTE VIRAL SYNDROME: ICD-10-CM

## 2021-08-28 DIAGNOSIS — Z34.93 THIRD TRIMESTER PREGNANCY: ICD-10-CM

## 2021-08-28 DIAGNOSIS — Z20.822 SUSPECTED COVID-19 VIRUS INFECTION: ICD-10-CM

## 2021-08-28 PROCEDURE — U0003 INFECTIOUS AGENT DETECTION BY NUCLEIC ACID (DNA OR RNA); SEVERE ACUTE RESPIRATORY SYNDROME CORONAVIRUS 2 (SARS-COV-2) (CORONAVIRUS DISEASE [COVID-19]), AMPLIFIED PROBE TECHNIQUE, MAKING USE OF HIGH THROUGHPUT TECHNOLOGIES AS DESCRIBED BY CMS-2020-01-R: HCPCS

## 2021-08-28 PROCEDURE — U0005 INFEC AGEN DETEC AMPLI PROBE: HCPCS

## 2021-08-28 PROCEDURE — 99284 EMERGENCY DEPT VISIT MOD MDM: CPT | Mod: EDC

## 2021-08-28 RX ORDER — ONDANSETRON 4 MG/1
4 TABLET, ORALLY DISINTEGRATING ORAL EVERY 4 HOURS PRN
Qty: 10 TABLET | Refills: 0 | Status: SHIPPED | OUTPATIENT
Start: 2021-08-28

## 2021-08-28 ASSESSMENT — FIBROSIS 4 INDEX: FIB4 SCORE: 0.35

## 2021-08-28 NOTE — ED NOTES
Patient states that she's had a few days of sore throat, ABD discomfort and Diarrhea. She is currently 31 weeks pregnant. She has been having some body aches and swollen nodes under her arms. No other specific S/Sx.

## 2021-08-28 NOTE — DISCHARGE INSTRUCTIONS
Follow-up with Dr. Howard early next week, in person or via telemedicine presently depending on test results and office preference), for reevaluation and continued prenatal care.    You likely have a viral illness and may have COVID-19. At this point we do not have your test results.  Therefore, COVID-19 is not ruled out and you will need to remain in home quarantine until all three of the following are true:  You are 10 days from symptom onset  your symptoms are improving   you have been fever free for at least  24 hours without taking any Tylenol or ibuprofen.    I recommend you get a home pulse oximeter.  Return if your oxygenation is less than 90.    Return to the emergency department for low oxygen levels as described above, for difficulty breathing, wheezing, intractable fever, altered mental status, vomiting or for any new pregnancy related concerns (for which she should be evaluated at labor and delivery) such as abdominal pain/cramping/contractions, leakage of fluid, vaginal bleeding, decreased fetal movement or other new concerns.    Tylenol every 4-6 hours as needed for fever, body aches or other discomfort.  Zofran, oral dissolving tablet, every 4-6 hours as needed for nausea or vomiting.    Encourage oral fluid hydration.  Advance diet as tolerated.

## 2021-08-28 NOTE — ED PROVIDER NOTES
ED Provider Note    CHIEF COMPLAINT  Chief Complaint   Patient presents with   • Flu Like Symptoms     PT states she has been having body aches, a sore throat, HA, and diarrhea for the last day and a half. Pt presents to the ER today because she has developed body aches and all symptoms have worsened. Pt is not vaccinated for covid.       HPI  Osiris Oates is a 31 y.o. female who presents to the emergency department through triage for multiple flulike symptoms.  Patient describes 2 days of body aches, chills, tactile fever.  Also associated with sore throat, headache, nausea, diarrhea.  Occasional vomiting, but unchanged with morning emesis during this pregnancy.  Decreased appetite but tolerating food and fluids.    31 weeks pregnant.  Followed by Dr. Howard.  She is not vaccinated against Covid.  Denies any abdominal cramping, vaginal bleeding or loss of fluid.  Still feeling appropriate fetal movements.  Denies known sick contacts or travel.    REVIEW OF SYSTEMS  See HPI for further details. All other systems are negative.     PAST MEDICAL HISTORY   has a past medical history of BELLE positive, Anxiety, Endometriosis, Endometriosis, Endometriosis, Gastroparesis, Interstitial cystitis, and Seizure disorder ([facility]) (2016).    SOCIAL HISTORY  Social History     Tobacco Use   • Smoking status: Former Smoker     Years: 3.00     Types: Cigarettes     Quit date: 2014     Years since quittin.0   • Smokeless tobacco: Never Used   Substance and Sexual Activity   • Alcohol use: No   • Drug use: Not Currently     Comment: marijuana   • Sexual activity: Not on file       SURGICAL HISTORY   has a past surgical history that includes tonsillectomy; laparoscopic destruction of endometriosis; other abdominal surgery; and appendectomy.    CURRENT MEDICATIONS  Home Medications    **Home medications have not yet been reviewed for this encounter**         ALLERGIES  Allergies   Allergen Reactions   • Tramadol Anaphylaxis  "    Per pt she took with Vicodin unsure which med caused the anaphylaxis    • Vicodin [Hydrocodone-Acetaminophen] Anaphylaxis     Per pt she took with Tramadol unsure which med caused the anaphylaxis (can take tylenol); tolerated Percocet 3/27/21   • Codeine Nausea     Gives nausea       PHYSICAL EXAM  VITAL SIGNS: /63   Pulse 88   Temp 36.8 °C (98.2 °F) (Temporal)   Resp 18   Ht 1.575 m (5' 2\")   Wt 63 kg (138 lb 14.2 oz)   LMP 10/30/2020 (Exact Date)   SpO2 96%   BMI 25.40 kg/m²   Pulse ox interpretation: I interpret this pulse ox as normal.  Constitutional: Alert in no apparent distress.  HENT: Normocephalic, atraumatic. Bilateral external ears normal, Nose normal. Moist mucous membranes.  Oropharynx within normal limits, no erythema, edema or exudate.  No tonsillar enlargement.  Uvula midline.  Tolerating secretions.  No stridor or dysphonia.  Eyes: Pupils are equal and reactive, Conjunctiva normal.   Neck: Normal range of motion, Supple.  No meningeal irritation.  Lymphatic: No lymphadenopathy noted.  No cervical or submandibular lymphadenopathy.  Cardiovascular: Regular rate and rhythm, no murmurs. Distal pulses intact.    Thorax & Lungs: Normal breath sounds.  No wheezing/rales/ronchi. No increased work of breathing, clipped speech or retractions.   Abdomen: Soft, non-distended.  Gravid above the umbilicus.  Nontender.  Skin: Warm, Dry, No erythema, No rash.   Musculoskeletal: Good range of motion in all major joints.   Neurologic: Alert and orient x4.  Ambulates independently.  Psychiatric: Affect normal, Judgment normal, Mood normal.       COURSE & MEDICAL DECISION MAKING  Nursing notes and vital signs were reviewed. (See chart for details)  The patients records were reviewed, history was obtained from the patient;     ED evaluation for multiple vague flulike symptoms most suggestive of viral illness.  COVID-19 testing is pending.  Isolation recommendations provided until results are available " and thereafter if positive.  No clinical evidence for pharyngitis, meningitis or pneumonia.  Abdominal exam is otherwise benign.  She is 31 weeks pregnant, denies any pregnancy related complaints.  Fetal heart tones at 142.  Hemodynamically stable, tachycardia improved without treatment.  No fever, hypotension or hypoxia.  No acute respiratory distress.  Otherwise well-appearing and nontoxic.    Patient is stable for discharge at this time, anticipatory guidance provided,Tylenol for fever discomfort, Zofran for nausea or vomiting, close follow-up is encouraged, and strict ED return instructions have been detailed. Patient is agreeable to the disposition and plan.    FINAL IMPRESSION  (B34.9) Acute viral syndrome  (Z20.822) Suspected COVID-19 virus infection  (Z34.93) Third trimester pregnancy      Electronically signed by: Davina Rosales D.O., 8/28/2021 4:52 AM      This dictation was created using voice recognition software. The accuracy of the dictation is limited to the abilities of the software. I expect there may be some errors of grammar and possibly content. The nursing notes were reviewed and certain aspects of this information were incorporated into this note.

## 2021-08-28 NOTE — ED TRIAGE NOTES
"Chief Complaint   Patient presents with   • Flu Like Symptoms     PT states she has been having body aches, a sore throat, HA, and diarrhea for the last day and a half. Pt presents to the ER today because she has developed body aches and all symptoms have worsened. Pt is not vaccinated for covid.       Ambulatory to triage for above complaint.   Educated on triage process, encourage to inform staff of any changes.     /77   Pulse (!) 106   Temp 36.5 °C (97.7 °F) (Oral)   Resp 16   Ht 1.575 m (5' 2\")   Wt 63 kg (138 lb 14.2 oz)   LMP 10/30/2020 (Exact Date)   SpO2 98%   BMI 25.40 kg/m²     "

## 2021-08-29 LAB
SARS-COV-2 RNA RESP QL NAA+PROBE: NOTDETECTED
SPECIMEN SOURCE: NORMAL

## 2021-09-25 ENCOUNTER — HOSPITAL ENCOUNTER (EMERGENCY)
Facility: MEDICAL CENTER | Age: 31
End: 2021-09-25
Attending: SPECIALIST | Admitting: SPECIALIST
Payer: COMMERCIAL

## 2021-09-25 VITALS
WEIGHT: 150 LBS | HEIGHT: 62 IN | TEMPERATURE: 99.1 F | SYSTOLIC BLOOD PRESSURE: 114 MMHG | BODY MASS INDEX: 27.6 KG/M2 | DIASTOLIC BLOOD PRESSURE: 67 MMHG | RESPIRATION RATE: 16 BRPM

## 2021-09-25 PROCEDURE — 59025 FETAL NON-STRESS TEST: CPT

## 2021-09-25 PROCEDURE — 302449 STATCHG TRIAGE ONLY (STATISTIC)

## 2021-09-25 ASSESSMENT — FIBROSIS 4 INDEX: FIB4 SCORE: 0.35

## 2021-09-26 NOTE — PROGRESS NOTES
Pre term labor precautions and fetal kick count instructions given to pt. Pt verbalized an understanding. Pt discharged home.

## 2021-09-26 NOTE — DISCHARGE INSTRUCTIONS
Pre-term Labor (<37 weeks):  Call your physician or return to the hospital if:  · You have painless regular contractions more than 4 in one hour.  · Your water breaks (remember time and color).  · You have menstrual-like cramps, a low dull backache or pressure in your pelvis or back.  · Your baby does not move enough to complete the daily kick count (10 movements in 2 hours).  · Your baby moves much less often than on the days before or you have not felt your baby move all day.  · Please review the MEDICATION LIST section of your AFTER VISIT SUMMARY document.  · Take your medication as prescribed  Fetal Movement Counts  Patient Name: ________________________________________________ Patient Due Date: ____________________  What is a fetal movement count?    A fetal movement count is the number of times that you feel your baby move during a certain amount of time. This may also be called a fetal kick count. A fetal movement count is recommended for every pregnant woman. You may be asked to start counting fetal movements as early as week 28 of your pregnancy.  Pay attention to when your baby is most active. You may notice your baby's sleep and wake cycles. You may also notice things that make your baby move more. You should do a fetal movement count:  · When your baby is normally most active.  · At the same time each day.  A good time to count movements is while you are resting, after having something to eat and drink.  How do I count fetal movements?  1. Find a quiet, comfortable area. Sit, or lie down on your side.  2. Write down the date, the start time and stop time, and the number of movements that you felt between those two times. Take this information with you to your health care visits.  3. For 2 hours, count kicks, flutters, swishes, rolls, and jabs. You should feel at least 10 movements during 2 hours.  4. You may stop counting after you have felt 10 movements.  5. If you do not feel 10 movements in 2 hours,  have something to eat and drink. Then, keep resting and counting for 1 hour. If you feel at least 4 movements during that hour, you may stop counting.  Contact a health care provider if:  · You feel fewer than 4 movements in 2 hours.  · Your baby is not moving like he or she usually does.  Date: ____________ Start time: ____________ Stop time: ____________ Movements: ____________  Date: ____________ Start time: ____________ Stop time: ____________ Movements: ____________  Date: ____________ Start time: ____________ Stop time: ____________ Movements: ____________  Date: ____________ Start time: ____________ Stop time: ____________ Movements: ____________  Date: ____________ Start time: ____________ Stop time: ____________ Movements: ____________  Date: ____________ Start time: ____________ Stop time: ____________ Movements: ____________  Date: ____________ Start time: ____________ Stop time: ____________ Movements: ____________  Date: ____________ Start time: ____________ Stop time: ____________ Movements: ____________  Date: ____________ Start time: ____________ Stop time: ____________ Movements: ____________  This information is not intended to replace advice given to you by your health care provider. Make sure you discuss any questions you have with your health care provider.  Document Released: 01/17/2008 Document Revised: 01/07/2020 Document Reviewed: 01/26/2017  Elsevier Patient Education © 2020 [employer]

## 2021-09-26 NOTE — PROGRESS NOTES
Dr. Yo in department, notified of pt arrival, VE's, and reactive NST. Orders to discharge home received.

## 2021-09-26 NOTE — PROGRESS NOTES
EDC 10/31/2021 EGA 34.5    Pt presented to L&D for c/o UC's since 1100 this AM, pt denies LOF, vaginal bleeding, states + fetal movement. EFM and TOCO applied. VE 1/thick/-3. Will monitor and update on call provider.

## 2021-10-29 ENCOUNTER — HOSPITAL ENCOUNTER (OUTPATIENT)
Dept: OBGYN | Facility: MEDICAL CENTER | Age: 31
End: 2021-10-29
Attending: SPECIALIST
Payer: MEDICAID

## 2021-10-29 LAB
SARS-COV-2 RNA RESP QL NAA+PROBE: NOTDETECTED
SPECIMEN SOURCE: NORMAL

## 2021-10-29 PROCEDURE — U0005 INFEC AGEN DETEC AMPLI PROBE: HCPCS

## 2021-10-29 PROCEDURE — U0003 INFECTIOUS AGENT DETECTION BY NUCLEIC ACID (DNA OR RNA); SEVERE ACUTE RESPIRATORY SYNDROME CORONAVIRUS 2 (SARS-COV-2) (CORONAVIRUS DISEASE [COVID-19]), AMPLIFIED PROBE TECHNIQUE, MAKING USE OF HIGH THROUGHPUT TECHNOLOGIES AS DESCRIBED BY CMS-2020-01-R: HCPCS

## 2021-11-01 ENCOUNTER — APPOINTMENT (OUTPATIENT)
Dept: OBGYN | Facility: MEDICAL CENTER | Age: 31
End: 2021-11-01
Attending: SPECIALIST
Payer: MEDICAID

## 2021-11-01 ENCOUNTER — HOSPITAL ENCOUNTER (INPATIENT)
Facility: MEDICAL CENTER | Age: 31
LOS: 1 days | End: 2021-11-02
Attending: SPECIALIST | Admitting: SPECIALIST
Payer: MEDICAID

## 2021-11-01 ENCOUNTER — ANESTHESIA EVENT (OUTPATIENT)
Dept: ANESTHESIOLOGY | Facility: MEDICAL CENTER | Age: 31
End: 2021-11-01
Payer: MEDICAID

## 2021-11-01 ENCOUNTER — ANESTHESIA (OUTPATIENT)
Dept: ANESTHESIOLOGY | Facility: MEDICAL CENTER | Age: 31
End: 2021-11-01
Payer: MEDICAID

## 2021-11-01 DIAGNOSIS — R10.2 PELVIC PAIN: ICD-10-CM

## 2021-11-01 LAB
BASOPHILS # BLD AUTO: 0.4 % (ref 0–1.8)
BASOPHILS # BLD: 0.06 K/UL (ref 0–0.12)
EOSINOPHIL # BLD AUTO: 0.01 K/UL (ref 0–0.51)
EOSINOPHIL NFR BLD: 0.1 % (ref 0–6.9)
ERYTHROCYTE [DISTWIDTH] IN BLOOD BY AUTOMATED COUNT: 50.6 FL (ref 35.9–50)
HCT VFR BLD AUTO: 38.7 % (ref 37–47)
HGB BLD-MCNC: 13.3 G/DL (ref 12–16)
HOLDING TUBE BB 8507: NORMAL
IMM GRANULOCYTES # BLD AUTO: 0.13 K/UL (ref 0–0.11)
IMM GRANULOCYTES NFR BLD AUTO: 0.9 % (ref 0–0.9)
LYMPHOCYTES # BLD AUTO: 3.59 K/UL (ref 1–4.8)
LYMPHOCYTES NFR BLD: 24.9 % (ref 22–41)
MCH RBC QN AUTO: 33.1 PG (ref 27–33)
MCHC RBC AUTO-ENTMCNC: 34.4 G/DL (ref 33.6–35)
MCV RBC AUTO: 96.3 FL (ref 81.4–97.8)
MONOCYTES # BLD AUTO: 0.88 K/UL (ref 0–0.85)
MONOCYTES NFR BLD AUTO: 6.1 % (ref 0–13.4)
NEUTROPHILS # BLD AUTO: 9.75 K/UL (ref 2–7.15)
NEUTROPHILS NFR BLD: 67.6 % (ref 44–72)
NRBC # BLD AUTO: 0 K/UL
NRBC BLD-RTO: 0 /100 WBC
PLATELET # BLD AUTO: 202 K/UL (ref 164–446)
PMV BLD AUTO: 10.3 FL (ref 9–12.9)
RBC # BLD AUTO: 4.02 M/UL (ref 4.2–5.4)
WBC # BLD AUTO: 14.4 K/UL (ref 4.8–10.8)

## 2021-11-01 PROCEDURE — 700111 HCHG RX REV CODE 636 W/ 250 OVERRIDE (IP): Performed by: ANESTHESIOLOGY

## 2021-11-01 PROCEDURE — 85025 COMPLETE CBC W/AUTO DIFF WBC: CPT

## 2021-11-01 PROCEDURE — 10907ZC DRAINAGE OF AMNIOTIC FLUID, THERAPEUTIC FROM PRODUCTS OF CONCEPTION, VIA NATURAL OR ARTIFICIAL OPENING: ICD-10-PCS | Performed by: OBSTETRICS & GYNECOLOGY

## 2021-11-01 PROCEDURE — 36415 COLL VENOUS BLD VENIPUNCTURE: CPT

## 2021-11-01 PROCEDURE — 59409 OBSTETRICAL CARE: CPT

## 2021-11-01 PROCEDURE — A9270 NON-COVERED ITEM OR SERVICE: HCPCS | Performed by: SPECIALIST

## 2021-11-01 PROCEDURE — 304965 HCHG RECOVERY SERVICES

## 2021-11-01 PROCEDURE — 770002 HCHG ROOM/CARE - OB PRIVATE (112)

## 2021-11-01 PROCEDURE — 303615 HCHG EPIDURAL/SPINAL ANESTHESIA FOR LABOR

## 2021-11-01 PROCEDURE — 700102 HCHG RX REV CODE 250 W/ 637 OVERRIDE(OP): Performed by: SPECIALIST

## 2021-11-01 PROCEDURE — 0HQ9XZZ REPAIR PERINEUM SKIN, EXTERNAL APPROACH: ICD-10-PCS | Performed by: OBSTETRICS & GYNECOLOGY

## 2021-11-01 PROCEDURE — 700111 HCHG RX REV CODE 636 W/ 250 OVERRIDE (IP): Performed by: SPECIALIST

## 2021-11-01 PROCEDURE — 700111 HCHG RX REV CODE 636 W/ 250 OVERRIDE (IP)

## 2021-11-01 PROCEDURE — 700101 HCHG RX REV CODE 250: Performed by: ANESTHESIOLOGY

## 2021-11-01 PROCEDURE — 700105 HCHG RX REV CODE 258: Performed by: SPECIALIST

## 2021-11-01 PROCEDURE — 3E033VJ INTRODUCTION OF OTHER HORMONE INTO PERIPHERAL VEIN, PERCUTANEOUS APPROACH: ICD-10-PCS | Performed by: SPECIALIST

## 2021-11-01 RX ORDER — ACETAMINOPHEN 325 MG/1
650 TABLET ORAL EVERY 4 HOURS PRN
Status: DISCONTINUED | OUTPATIENT
Start: 2021-11-01 | End: 2021-11-02 | Stop reason: HOSPADM

## 2021-11-01 RX ORDER — ONDANSETRON 4 MG/1
4 TABLET, ORALLY DISINTEGRATING ORAL EVERY 6 HOURS PRN
Status: DISCONTINUED | OUTPATIENT
Start: 2021-11-01 | End: 2021-11-02 | Stop reason: HOSPADM

## 2021-11-01 RX ORDER — LIDOCAINE HYDROCHLORIDE AND EPINEPHRINE 15; 5 MG/ML; UG/ML
INJECTION, SOLUTION EPIDURAL
Status: COMPLETED | OUTPATIENT
Start: 2021-11-01 | End: 2021-11-01

## 2021-11-01 RX ORDER — MISOPROSTOL 200 UG/1
600 TABLET ORAL
Status: DISCONTINUED | OUTPATIENT
Start: 2021-11-01 | End: 2021-11-02 | Stop reason: HOSPADM

## 2021-11-01 RX ORDER — DOCUSATE SODIUM 100 MG/1
100 CAPSULE, LIQUID FILLED ORAL 2 TIMES DAILY PRN
Status: DISCONTINUED | OUTPATIENT
Start: 2021-11-01 | End: 2021-11-02 | Stop reason: HOSPADM

## 2021-11-01 RX ORDER — BUPIVACAINE HYDROCHLORIDE 2.5 MG/ML
INJECTION, SOLUTION EPIDURAL; INFILTRATION; INTRACAUDAL
Status: COMPLETED | OUTPATIENT
Start: 2021-11-01 | End: 2021-11-01

## 2021-11-01 RX ORDER — IBUPROFEN 600 MG/1
600 TABLET ORAL EVERY 6 HOURS PRN
Status: DISCONTINUED | OUTPATIENT
Start: 2021-11-01 | End: 2021-11-02 | Stop reason: HOSPADM

## 2021-11-01 RX ORDER — METHYLERGONOVINE MALEATE 0.2 MG/ML
0.2 INJECTION INTRAVENOUS
Status: DISCONTINUED | OUTPATIENT
Start: 2021-11-01 | End: 2021-11-02 | Stop reason: HOSPADM

## 2021-11-01 RX ORDER — ONDANSETRON 2 MG/ML
INJECTION INTRAMUSCULAR; INTRAVENOUS
Status: COMPLETED
Start: 2021-11-01 | End: 2021-11-01

## 2021-11-01 RX ORDER — SODIUM CHLORIDE, SODIUM LACTATE, POTASSIUM CHLORIDE, AND CALCIUM CHLORIDE .6; .31; .03; .02 G/100ML; G/100ML; G/100ML; G/100ML
250 INJECTION, SOLUTION INTRAVENOUS PRN
Status: DISCONTINUED | OUTPATIENT
Start: 2021-11-01 | End: 2021-11-01 | Stop reason: HOSPADM

## 2021-11-01 RX ORDER — SODIUM CHLORIDE, SODIUM LACTATE, POTASSIUM CHLORIDE, CALCIUM CHLORIDE 600; 310; 30; 20 MG/100ML; MG/100ML; MG/100ML; MG/100ML
INJECTION, SOLUTION INTRAVENOUS CONTINUOUS
Status: ACTIVE | OUTPATIENT
Start: 2021-11-01 | End: 2021-11-01

## 2021-11-01 RX ORDER — BUPIVACAINE HYDROCHLORIDE 2.5 MG/ML
INJECTION, SOLUTION EPIDURAL; INFILTRATION; INTRACAUDAL
Status: COMPLETED
Start: 2021-11-01 | End: 2021-11-01

## 2021-11-01 RX ORDER — SODIUM CHLORIDE, SODIUM LACTATE, POTASSIUM CHLORIDE, CALCIUM CHLORIDE 600; 310; 30; 20 MG/100ML; MG/100ML; MG/100ML; MG/100ML
INJECTION, SOLUTION INTRAVENOUS PRN
Status: DISCONTINUED | OUTPATIENT
Start: 2021-11-01 | End: 2021-11-02 | Stop reason: HOSPADM

## 2021-11-01 RX ORDER — SODIUM CHLORIDE, SODIUM LACTATE, POTASSIUM CHLORIDE, AND CALCIUM CHLORIDE .6; .31; .03; .02 G/100ML; G/100ML; G/100ML; G/100ML
1000 INJECTION, SOLUTION INTRAVENOUS
Status: DISCONTINUED | OUTPATIENT
Start: 2021-11-01 | End: 2021-11-01 | Stop reason: HOSPADM

## 2021-11-01 RX ORDER — ROPIVACAINE HYDROCHLORIDE 2 MG/ML
INJECTION, SOLUTION EPIDURAL; INFILTRATION; PERINEURAL CONTINUOUS
Status: DISCONTINUED | OUTPATIENT
Start: 2021-11-01 | End: 2021-11-02 | Stop reason: HOSPADM

## 2021-11-01 RX ORDER — ACETAMINOPHEN 325 MG/1
325 TABLET ORAL EVERY 4 HOURS PRN
Status: DISCONTINUED | OUTPATIENT
Start: 2021-11-01 | End: 2021-11-02 | Stop reason: HOSPADM

## 2021-11-01 RX ORDER — ONDANSETRON 2 MG/ML
4 INJECTION INTRAMUSCULAR; INTRAVENOUS EVERY 6 HOURS PRN
Status: DISCONTINUED | OUTPATIENT
Start: 2021-11-01 | End: 2021-11-02 | Stop reason: HOSPADM

## 2021-11-01 RX ADMIN — ACETAMINOPHEN 650 MG: 325 TABLET ORAL at 19:09

## 2021-11-01 RX ADMIN — IBUPROFEN 600 MG: 600 TABLET ORAL at 16:32

## 2021-11-01 RX ADMIN — FENTANYL CITRATE 100 MCG: 50 INJECTION INTRAMUSCULAR; INTRAVENOUS at 12:01

## 2021-11-01 RX ADMIN — FENTANYL CITRATE 100 MCG: 50 INJECTION, SOLUTION INTRAMUSCULAR; INTRAVENOUS at 14:01

## 2021-11-01 RX ADMIN — ONDANSETRON 4 MG: 2 INJECTION INTRAMUSCULAR; INTRAVENOUS at 12:25

## 2021-11-01 RX ADMIN — OXYTOCIN 2000 ML/HR: 10 INJECTION, SOLUTION INTRAMUSCULAR; INTRAVENOUS at 15:25

## 2021-11-01 RX ADMIN — ROPIVACAINE HYDROCHLORIDE: 2 INJECTION, SOLUTION EPIDURAL; INFILTRATION at 14:10

## 2021-11-01 RX ADMIN — LIDOCAINE HYDROCHLORIDE,EPINEPHRINE BITARTRATE 3 ML: 15; .005 INJECTION, SOLUTION EPIDURAL; INFILTRATION; INTRACAUDAL; PERINEURAL at 14:01

## 2021-11-01 RX ADMIN — FENTANYL CITRATE 100 MCG: 50 INJECTION INTRAMUSCULAR; INTRAVENOUS at 13:12

## 2021-11-01 RX ADMIN — BUPIVACAINE HYDROCHLORIDE 5 ML: 2.5 INJECTION, SOLUTION EPIDURAL; INFILTRATION; INTRACAUDAL; PERINEURAL at 14:01

## 2021-11-01 RX ADMIN — OXYTOCIN 2 MILLI-UNITS/MIN: 10 INJECTION, SOLUTION INTRAMUSCULAR; INTRAVENOUS at 08:15

## 2021-11-01 RX ADMIN — SODIUM CHLORIDE, POTASSIUM CHLORIDE, SODIUM LACTATE AND CALCIUM CHLORIDE: 600; 310; 30; 20 INJECTION, SOLUTION INTRAVENOUS at 12:02

## 2021-11-01 RX ADMIN — IBUPROFEN 600 MG: 600 TABLET ORAL at 22:35

## 2021-11-01 RX ADMIN — OXYTOCIN 125 ML/HR: 10 INJECTION, SOLUTION INTRAMUSCULAR; INTRAVENOUS at 16:33

## 2021-11-01 ASSESSMENT — FIBROSIS 4 INDEX: FIB4 SCORE: 0.35

## 2021-11-01 ASSESSMENT — PAIN SCALES - GENERAL: PAIN_LEVEL: 0

## 2021-11-01 ASSESSMENT — LIFESTYLE VARIABLES: ALCOHOL_USE: NO

## 2021-11-01 ASSESSMENT — PATIENT HEALTH QUESTIONNAIRE - PHQ9
2. FEELING DOWN, DEPRESSED, IRRITABLE, OR HOPELESS: NOT AT ALL
SUM OF ALL RESPONSES TO PHQ9 QUESTIONS 1 AND 2: 0
1. LITTLE INTEREST OR PLEASURE IN DOING THINGS: NOT AT ALL

## 2021-11-01 NOTE — PROGRESS NOTES
L&D Progress note    S: Pt is doing well. Pt has progress well from 4 to 9 cm. Pt was just able to get an epidural and is starting to get more comfortable. Pt is otherwise without complaints.    O:   Vitals:    21 0646   BP: 109/63   Pulse: 80   Resp: 18   Temp: 36.1 °C (97 °F)          SVE: 9/90/-1, ROM clear fluid  FHT: Cat 1  [place]: irregular now, pit was help prior to the epidural. Pit has now been re-started at 5    A/P: 31 y.o.  at 40w1d doing well  - Continue routine labor care  - Continue close observation  -Cont pit for augmentation

## 2021-11-01 NOTE — ANESTHESIA PREPROCEDURE EVALUATION
30y/o F at term in active labor requesting GINA    Relevant Problems   No relevant active problems       Physical Exam    Airway   Mallampati: II  TM distance: >3 FB  Neck ROM: full       Cardiovascular - normal exam  Rhythm: regular  Rate: normal  (-) murmur     Dental - normal exam           Pulmonary - normal exam  Breath sounds clear to auscultation     Abdominal    Neurological - normal exam                 Anesthesia Plan    ASA 2       Plan - epidural   Neuraxial block will be labor analgesia                  Pertinent diagnostic labs and testing reviewed    Informed Consent:    Anesthetic plan and risks discussed with patient.

## 2021-11-01 NOTE — H&P
Osiris Oates  YOB: 1990  Date of today's admission: Monday, November 1, 2021  Facility: [facility]    ID: The patient is a very pleasant 31-year-old primipara (para-one with one previous vaginal delivery) who is today 40 weeks and 1 day gestation.    Chief Complaint: The patient has no complaints other than for generalized discomfort consistent with current pregnancy.    History of Present Illness: The patient has had prenatal care with myself during the course of this pregnancy. She has also been seen during the course of this pregnancy by perinatology asked Dr. Salinas. She was admitted this morning for induction of labor for postdates. Her cervix was found to be about two centimeters dilated and 50 percent effaced in the fetal heart racing was found to be category one. Pitocin was started and gradually increased. Her recent vaginal culture for group B strep came back negative for group B strep. Retracing continues to be category one and she is now having contractions every 2 to 3 minutes. We will continue Pitocin and give analgesia as needed and anticipated obstetrical delivery.    Past Medical History: The patient has no medical illnesses.    Past Surgical History: The patient has had previous appendectomy and previous tonsillectomy.    Medications: The patient takes no medications other than for prenatal vitamins.    Allergies: The patient says she is allergic to birth-control pills and says she is allergic to narcotics.    Social History: The patient denies smoking. She denies consuming alcoholic beverages. She denies use of recreational drugs.    Review of Systems  General: The patient denies any fevers, chills, sweats.  Pulmonary: The patient denies any coughing, wheezing, chest pain, shortness of breath.  Cardiovascular: The patient denies any palpitations, dyspnea, chest pain.  Gastrointestinal: The patient denies any nausea, vomiting, diarrhea, constipation,  hematochezia, melena, history of hepatitis, history of jaundice.  Genitourinary: The patient denies leakage of fluid per vagina. She denies vaginal bleeding.  Musculoskeletal: The patient denies any arthralgias or myalgias other than for hip pain and low back pain.   Neurological: No headaches or syncope or seizures.     Physical Exam:   Vital Signs: The patient's vital signs are stable and she is afebrile.  General: The patient appears well developed and well nourished and relaxed and alert and comfortable and in no apparent distress.    HEENT :  Normo-cephalic, atraumatic, pupils equal, round, reactive to light and accommodation, extra ocular motions intact, pharynx clear; there is no thyromegaly. There is no cervical lymphadenopathy.  Chest: Heart regular rate and rhythm, with no murmurs or rubs or gallops; the lungs are clear to auscultation bilaterally.  Abdomen: The abdomen is gravid and is about nine months size.   Pelvic: The cervix is about 2 centimeters dilated and 50 percent effaced and minus to station.  Extremities: No clubbing or cyanosis or edema except for my bilateral lower extremity edema consistent with term pregnancy.   Neurological: non-focal.     Assessment:   1.) Intrauterine pregnancy 40 weeks and 1 day gestation.  2.) Induction of labor for postdates.    Plan:   We will continue electronic fetal monitoring and continue Pitocin and give analgesia as needed and anticipate and obstetrical delivery. The estimated fetal weight is 3000 grams.            ________________________  Tl Howard M.D.

## 2021-11-01 NOTE — PROGRESS NOTES
Late Entry    0700-SVE=2/50/-2, Dr. Howard updated on exam, orders to start pitocin, 2x2. 0815-Pitocin started.  1100-Dr. Howard at BS.  1135-Dr. Howard phoned to state Dr. Andrea would be taking over care of the patient-pt updated on poc.  1150-Dr. Cannon called, requested VE-SVE=4-5/80/-2.   1342-SVE per Lakisha RN=9/90/-1.  1345-Oxytocin off. 1346-Dr. Phan called-epidural requested.  1355-Timeout called with Sylvester and Rito for epidural placement.  1411-AROM, clear per Dr. Andrea, 9/90/-1.  1450-no change.  Sewell inserted, pt placed on peanut bal, far right side. 1459-SVE per Dr. Andrea=complete, pt pushing.  1523-Delivery of viable male, 8/9 apgars.  1527-Placenta delivered, pitocin running.  1705-BS report given to Valentino RN-pt care assumed

## 2021-11-01 NOTE — ANESTHESIA PROCEDURE NOTES
Epidural Block    Date/Time: 11/1/2021 2:01 PM  Performed by: Derek Phan M.D.  Authorized by: Derek Phan M.D.     Patient Location:  OB  Start Time:  11/1/2021 2:01 PM  Reason for Block: labor analgesia    patient identified, IV checked, site marked, risks and benefits discussed, surgical consent, monitors and equipment checked, pre-op evaluation and timeout performed    Patient Position:  Sitting  Prep: ChloraPrep, patient draped and sterile technique    Monitoring:  Blood pressure, continuous pulse oximetry and heart rate  Approach:  Midline  Location:  L3-L4  Injection Technique:  APRIL saline  Skin infiltration:  Lidocaine  Strength:  1%  Dose:  3ml  Needle Type:  Tuohy  Needle Gauge:  17 G  Needle Length:  3.5 in  Loss of resistance::  5  Catheter Size:  19 G  Catheter at Skin Depth:  11  Test Dose Result:  Negative   Placed with one attempt

## 2021-11-01 NOTE — PROGRESS NOTES
Summary: Pt pushed well and spontaneously delivered a viable male with Apgars 8/9 weighing pending gm in OA position; nuchal x 1, reduced at perineum, body delivered without difficulty. Infant was placed on mother's chest. Delayed cord clamping was completed and then the the cord was clamped and cut.  Placenta spontaneously delivered, intact. Fundus was firm with minimal bleeding. Vagina and perineum inspected- 1st degree laceration was seen and repaired under epidural with 2-0 Vicryl in usual fashion in a figure of 8 fashion. Excellent hemostasis was achieved. . Mother and baby doing well.

## 2021-11-01 NOTE — ANESTHESIA POSTPROCEDURE EVALUATION
Patient: Osiris Oates    Procedure Summary     Date: 11/01/21 Room / Location:     Anesthesia Start: 1348 Anesthesia Stop: 1523    Procedure: Labor Epidural Diagnosis:     Scheduled Providers:  Responsible Provider: Derek Phan M.D.    Anesthesia Type: epidural ASA Status: 2          Final Anesthesia Type: epidural  Last vitals  BP   Blood Pressure: 109/63    Temp   36.1 °C (97 °F)    Pulse   80   Resp   18    SpO2          Anesthesia Post Evaluation    Patient location during evaluation: PACU  Patient participation: complete - patient participated  Level of consciousness: awake and alert  Pain score: 0    Airway patency: patent  Anesthetic complications: no  Cardiovascular status: hemodynamically stable  Respiratory status: acceptable  Hydration status: euvolemic    PONV: none          No complications documented.     Nurse Pain Score: 0 (NPRS)

## 2021-11-01 NOTE — ANESTHESIA TIME REPORT
Anesthesia Start and Stop Event Times     Date Time Event    11/1/2021 1346 Ready for Procedure     1348 Anesthesia Start     1523 Anesthesia Stop        Responsible Staff  11/01/21    Name Role Begin End    Derek Phan M.D. Anesth 1348 1523        Preop Diagnosis (Free Text):  Pre-op Diagnosis             Preop Diagnosis (Codes):    Premium Reason  A. 3PM - 7AM    Comments:

## 2021-11-02 VITALS
DIASTOLIC BLOOD PRESSURE: 75 MMHG | RESPIRATION RATE: 18 BRPM | TEMPERATURE: 97.9 F | HEART RATE: 68 BPM | WEIGHT: 150 LBS | BODY MASS INDEX: 27.6 KG/M2 | HEIGHT: 62 IN | SYSTOLIC BLOOD PRESSURE: 111 MMHG | OXYGEN SATURATION: 98 %

## 2021-11-02 PROBLEM — O48.0 POST-DATES PREGNANCY: Status: ACTIVE | Noted: 2021-11-02

## 2021-11-02 LAB
ERYTHROCYTE [DISTWIDTH] IN BLOOD BY AUTOMATED COUNT: 52.4 FL (ref 35.9–50)
HCT VFR BLD AUTO: 34.7 % (ref 37–47)
HGB BLD-MCNC: 11.6 G/DL (ref 12–16)
MCH RBC QN AUTO: 32.5 PG (ref 27–33)
MCHC RBC AUTO-ENTMCNC: 33.4 G/DL (ref 33.6–35)
MCV RBC AUTO: 97.2 FL (ref 81.4–97.8)
PLATELET # BLD AUTO: 160 K/UL (ref 164–446)
PMV BLD AUTO: 10.1 FL (ref 9–12.9)
RBC # BLD AUTO: 3.57 M/UL (ref 4.2–5.4)
WBC # BLD AUTO: 15.6 K/UL (ref 4.8–10.8)

## 2021-11-02 PROCEDURE — 700102 HCHG RX REV CODE 250 W/ 637 OVERRIDE(OP): Performed by: OBSTETRICS & GYNECOLOGY

## 2021-11-02 PROCEDURE — A9270 NON-COVERED ITEM OR SERVICE: HCPCS | Performed by: SPECIALIST

## 2021-11-02 PROCEDURE — 85027 COMPLETE CBC AUTOMATED: CPT

## 2021-11-02 PROCEDURE — A9270 NON-COVERED ITEM OR SERVICE: HCPCS | Performed by: OBSTETRICS & GYNECOLOGY

## 2021-11-02 PROCEDURE — 36415 COLL VENOUS BLD VENIPUNCTURE: CPT

## 2021-11-02 PROCEDURE — 700102 HCHG RX REV CODE 250 W/ 637 OVERRIDE(OP): Performed by: SPECIALIST

## 2021-11-02 RX ORDER — IBUPROFEN 800 MG/1
800 TABLET ORAL EVERY 8 HOURS PRN
Qty: 30 TABLET | Refills: 0 | Status: SHIPPED | OUTPATIENT
Start: 2021-11-02

## 2021-11-02 RX ADMIN — IBUPROFEN 600 MG: 600 TABLET ORAL at 04:54

## 2021-11-02 RX ADMIN — DOCUSATE SODIUM 100 MG: 100 CAPSULE, LIQUID FILLED ORAL at 08:06

## 2021-11-02 RX ADMIN — ACETAMINOPHEN 650 MG: 325 TABLET ORAL at 16:17

## 2021-11-02 RX ADMIN — IBUPROFEN 600 MG: 600 TABLET ORAL at 12:46

## 2021-11-02 RX ADMIN — ACETAMINOPHEN 650 MG: 325 TABLET ORAL at 08:06

## 2021-11-02 ASSESSMENT — EDINBURGH POSTNATAL DEPRESSION SCALE (EPDS)
THE THOUGHT OF HARMING MYSELF HAS OCCURRED TO ME: NEVER
I HAVE BEEN SO UNHAPPY THAT I HAVE BEEN CRYING: NO, NEVER
I HAVE BEEN SO UNHAPPY THAT I HAVE HAD DIFFICULTY SLEEPING: NOT AT ALL
I HAVE FELT SCARED OR PANICKY FOR NO GOOD REASON: NO, NOT MUCH
THINGS HAVE BEEN GETTING ON TOP OF ME: NO, MOST OF THE TIME I HAVE COPED QUITE WELL
I HAVE BEEN ANXIOUS OR WORRIED FOR NO GOOD REASON: YES, SOMETIMES
I HAVE FELT SAD OR MISERABLE: NOT VERY OFTEN
I HAVE BEEN ABLE TO LAUGH AND SEE THE FUNNY SIDE OF THINGS: AS MUCH AS I ALWAYS COULD
I HAVE LOOKED FORWARD WITH ENJOYMENT TO THINGS: AS MUCH AS I EVER DID
I HAVE BLAMED MYSELF UNNECESSARILY WHEN THINGS WENT WRONG: YES, SOME OF THE TIME

## 2021-11-02 NOTE — DISCHARGE PLANNING
Discharge Planning Assessment Post Partum    Reason for Referral: History of anxiety and THC  Address: [address] [place] [address] [address] [place] [address]  Phone: [phone]  Type of Living Situation: living with FOB and son  Mom Diagnosis: Pregnancy  Baby Diagnosis: Martha-40.1 weeks  Primary Language: English     Name of Baby: Scooby Lamar (: 21)  Father of the Baby: Ruddy Lamar (: 92)  Involved in baby’s care? Yes  Contact Information: [phone]    Prenatal Care: Yes  Mom's PCP: None  PCP for new baby: Pediatrician list provided to parents    Support System: FOB  Coping/Bonding between mother & baby: Yes  Source of Feeding: breast feeding  Supplies for Infant: prepared for infant; denies any needs    Mom's Insurance: [employer]  Baby Covered on Insurance:Yes  Mother Employed/School: Not currently  Other children in the home/names & ages: 2 year old son-Arpit Lamar (: 18)    Financial Hardship/Income: No, FOB is employed   Mom's Mental status: alert and oriented  Services used prior to admit: Medicaid     CPS History: No  Psychiatric History: history of anxiety.  MOB scored a 7 on the EPDS screen.  Discussed with mother and provided a counseling resource specializing in maternal mental health  Domestic Violence History: No  Drug/ETOH History: history of THC.  MOB admits to using during pregnancy to help with the nausea.  MOB does not plan to continue using.  Infant's UDS is pending    Resources Provided: pediatrician list, children and family resource list, and post partum support and counseling resources provided  Referrals Made: diaper bank referral provided     Clearance for Discharge: Infant's UDS is pending.  If positive for marijuana a report will be made to DCFS, which is information only and infant is cleared for discharge.  If positive for any other substances, RN will need to notify the ED SW at [phone].

## 2021-11-02 NOTE — DISCHARGE INSTRUCTIONS
PATIENT DISCHARGE EDUCATION INSTRUCTION SHEET    REASONS TO CALL YOUR OBSTETRICIAN  · Persistent fever, shaking, chills (Temperature higher than 100.4) may indicate you have an infection  · Heavy bleeding: soaking more than 1 pad per hour; Passing clots an egg-sized clot or bigger may mean you have an postpartum hemorrhage  · Foul odor from vagina or bad smelling or discolored discharge or blood  · Breast infection (Mastitis symptoms); breast pain, chills, fever, redness or red streaks, may feel flu like symptoms  · Urinary pain, burning or frequency  · Incision that is not healing, increased redness, swelling, tenderness or pain, or any pus from episiotomy or  site may mean you have an infection  · Redness, swelling, warmth, or painful to touch in the calf area of your leg may mean you have a blood clot  · Severe or intensified depression, thoughts or feelings of wanting to hurt yourself or someone else   · Pain in chest, obstructed breathing or shortness of breath (trouble catching your breath) may mean you are having a postpartum complication. Call your provider immediately   · Headache that does not get better, even after taking medicine, a bad headache with vision changes or pain in the upper right area of your belly may mean you have high blood pressure or post birth preeclampsia. Call your provider immediately    HAND WASHING  All family and friends should wash their hands:  · Before and after holding the baby  · Before feeding the baby  · After using the restroom or changing the baby's diaper    WOUND CARE  Ask your physician for additional care instructions. In general:  · Episiotomy/Laceration  · May use gab-spray bottle, witch hazel pads and dermaplast spray for comfort  · Use gab-spray bottle after urinating to cleanse perineal area  · To prevent burning during urination spray gab-water bottle on labial area   · Pat perineal area dry until episiotomy/laceration is healed  · Continue to use  gab-bottle until bleeding stops as needed  · If have a 2nd degree laceration or greater, a Sitz bath can offer relief from soreness, burning, and inflammation   · Sitz Bath   · Sit in 6 inches of warm water and soak laceration as needed until the laceration heals    VAGINAL CARE AND BLEEDING  · Nothing inside vagina for 6 weeks:   · No sexual intercourse, tampons or douching  · Bleeding may continue for 2-4 weeks. Amount and color may vary  · Soaking 1 pad or more in an hour for several hours is considered heavy bleeding  · Passing large egg sized blood clots can be concerning  · If you feel like you have heavy bleeding or are having increasing amount of blood clots call your Obstetrician immediately  · If you begin feeling faint upon standing, feeling sick to your stomach, have clammy skin, a really fast heartbeat, have chills, start feeling confused, dizzy, sleepy or weak, or feeling like you're going to faint call your Obstetrician immediately    HYPERTENSION   Preeclampsia or gestational hypertension are types of high blood pressure that only pregnant women can get. It is important for you to be aware of symptoms to seek early intervention and treatment. If you have any of these symptoms immediately call your Obstetrician    · Vision changes or blurred vision   · Severe headache or pain that is unrelieved with medication and will not go away  · Persistent pain in upper abdomen or shoulder   · Increased swelling of face, feet, or hands  · Difficulty breathing or shortness of breath at rest  · Urinating less than usual    URINATION AND BOWEL MOVEMENTS  · Eating more fiber (bran cereal, fruits, and vegetables) and drinking plenty of fluids will help to avoid constipation  · Urinary frequency and urgency after childbirth is normal  · If you experience any urinary pain, burning or frequency call your provider    BIRTH CONTROL  · It is possible to become pregnant at any time after delivery and while  "breastfeeding  · Plan to discuss a method of birth control with your physician at your post delivery follow up visit    POSTPARTUM BLUES  During the first few days after birth, you may experience a sense of the \"blues\" which may include impatience, irritability or even crying. These feelings come and go quickly. However, as many as 1 in 10 women experience emotional symptoms known as postpartum depression.     POSTPARTUM DEPRESSION    May start as early as the second or third day after delivery or take several weeks or months to develop. Symptoms of \"blues\" are present, but are more intense: Crying spells; loss of appetite; feelings of hopelessness or loss of control; fear of touching the baby; over concern or no concern at all about the baby; little or no concern about your own appearance/caring for yourself; and/or inability to sleep or excessive sleeping. Contact your Obstetrician if you are experiencing any of these symptoms     PREVENTING SHAKEN BABY  If you are angry or stressed, PUT THE BABY IN THE CRIB, step away, take some deep breaths, and wait until you are calm to care for the baby. DO NOT SHAKE THE BABY. You are not alone, call a supporter for help.  · Crisis [facility] Center 24/7 crisis call line (998-297-3375) or (1-907.700.4793)  · You can also text them, text \"ANSWER\" (292406)      "

## 2021-11-02 NOTE — PROGRESS NOTES
POST PARTUM DAY # 1  The patient complains of cramping pain.   She says that she feels fine otherwise and she says that she has no other problems or complaints.   She says that she would like to go home today.   The patient's vital signs are stable and she is afebrile.   She appears well developed and well nourished and relaxed and alert and comfortable and in no apparent distress.   Labs: the patient's hemoglobin went from 13.3 grams per deciliter antepartum to 11.6 grams per deciliter post partum.   We will plan on discharge home later today.   Rx's for percocet and ibuprofen.   Follow up in office in about six weeks for a post partum visit.   Tl Howard M.D.

## 2021-11-02 NOTE — PROGRESS NOTES
1900- report received from day RN. POC discussed including feeding intervals, I+O documentation, latch support, lochia changes, ambulation, infant temperature management including STS and layering, weights, VS intervals, and discharge planning. Medications and other comfort measures discussed. Call light in reach.     2330- infant just finished with feeding prior to RN arrival. MOB reports infant latched deeply for approximately 20 minutes on left side. Colostrum transferred observed on left upper lip. MOB reports no pinching sensation of nipples and feels increased cramping during feeds. Reports infant latches around nipple and into areola. Verbalized understanding to call RN for latch observation and help if needed at next feeding. I+O documentation sheet discussed.

## 2021-11-02 NOTE — PROGRESS NOTES
1705 Report received from Rachel SMALLWOOD  1730 Pt ambulated to bathroom in stable condition at this time. Pt able to void. Lea pads changed.  1740 Pt transferred to  in stable condition via wheel chair with infant in arms. Report given to Clair SMALLWOOD. Infant bands matched x2 cuddles active.

## 2021-11-02 NOTE — CARE PLAN
The patient is Stable - Low risk of patient condition declining or worsening    Shift Goals  Clinical Goals: lochia WDL  Patient Goals: pain WDL  Family Goals: bond/provide ADLs    Progress made toward(s) clinical / shift goals:    Problem: Psychosocial - Postpartum  Goal: Patient will verbalize and demonstrate effective bonding and parenting behavior  Outcome: Progressing     Problem: Altered Physiologic Condition  Goal: Patient physiologically stable as evidenced by normal lochia, palpable uterine involution and vitals within normal limits  Outcome: Progressing       Patient is not progressing towards the following goals:

## 2021-11-02 NOTE — PROGRESS NOTES
12-hour chart check done. MAR and notes reviewed. Report received from Verónica SMALLWOOD @ 0700. Assumed care. Patient is awake and coherent.       @ 0800: Discussed plan of care. Assessment done. Medicated for pain. She has a discharge order for today but patient is aware to stay until urine tox result for baby is back.

## 2021-11-02 NOTE — CARE PLAN
The patient is Stable - Low risk of patient condition declining or worsening    Shift Goals  Clinical Goals: Maintain fundus firm, lochia light    Progress made toward(s) clinical / shift goals:  Fundus firm, lochia scant

## 2021-11-02 NOTE — L&D DELIVERY NOTE
DATE OF SERVICE:  2021     The patient is a very pleasant 31-year-old (on admission, para 1 with 1 previous vaginal delivery) who was admitted on 2021 for induction of labor for postdates.  She was on that day 40 weeks and 0 days gestation.  Her due date had been established as 10/31/2021.  She had a prenatal care with myself during the course of her pregnancy.  Her recent vaginal culture for group B strep had come back negative for group B strep.  On admission, her cervix was found to be 2 cm dilated and 50% effaced and -2 station.  She was started on Pitocin.  The Pitocin was gradually increased.  The estimated fetal weight was 3 kg.  The fetal heart tracing was category 1.  The fetal heart tracing continued to be category 1.  She began having frequent painful uterine contractions.  At a few minutes before noontime, her cervix was about 4-5 cm dilated and 80% effaced and -2 station.  Then, about 1 hour later or so, her cervix was 9 cm dilated, 90% effaced and -1 station.  She received a labor epidural, which functioned well.  Then, 11 minutes after 2:00 in the afternoon, artificial rupture of membranes and clear amniotic fluid was observed.  The patient did not have to push long before delivering.  It was 20 minutes after 3:00 in the afternoon, 2021 that she went on to have a normal spontaneous vaginal delivery, at 40 weeks and 1 day gestation, and was delivered of a live postdates male  with Apgar scores of 8 and 9 at 1 and 5 minutes respectively and a  weight of 3310 grams.  A small midline perineal laceration was repaired with 2-0 Vicryl.  The placenta was simply delivered and examined and found to be complete.  Estimated blood loss was 250 mL.        ______________________________  Tl Howard MD    MED/KAREEN/MAN    DD:  2021 07:24  DT:  2021 08:27    Job#:  [id number]

## 2021-11-02 NOTE — LACTATION NOTE
This note was copied from a baby's chart.  Mother reports she is breastfeeding her infant independently without pain or discomfort, offered breastfeeding assistance and mother declines, reports she successfully breast fed her first baby for 26 months. Reviewed frequency/duration of feeds, cluster feeds, milk onset, and infant diaper output/stool changes. Plan is cue based breastfeeding at least 8 or more times per 24 hours. Declines outpatient resources. Denies questions/concerns.

## 2021-11-02 NOTE — PROGRESS NOTES
"1750- Patient arrived to Room S319.  Report received from CATY Feliciano RN.    3076- Patient assessment done.  IV patent.  Patient c/o mild headache rated as \"1\" out of 10.  Patient oriented to room and call system.  Reviewed plan of care.  Patient verbalized understanding.  FOB and visitor at bedside.  "

## 2021-11-03 NOTE — PROGRESS NOTES
1900- report received from day RN. Discharge pending if infant voids and tox screen results in negative results (other than THC). MOB reports she received fentanyl in L&D. If utox comes back positive for anything other than THC, ED SW to be called for clearance/follow up. If cleared, Mother and baby may still discharge. No further needs at this time. Infant voiding in collection bag upon arrival to room. utox sent to lab and released for processing.     2038- car seat check performed and pt and infant escorted to car with staff. All belongings present. Cuddles alarm verified and removed. No further questions or need at this time.

## 2021-11-03 NOTE — CARE PLAN
The patient is Stable - Low risk of patient condition declining or worsening    Shift Goals  Clinical Goals: Pain control.  Patient Goals: To be able to breast feed infant every 2 or 3 hours.      Progress made toward(s) clinical / shift goals:  Patient has a good pain control with the help of Ibuprofen and Tylenol. She's feeding her baby on demand. She has a discharge order but waiting for baby to void for drug screening. Patient and FOB's aware

## 2023-07-11 ENCOUNTER — OFFICE VISIT (OUTPATIENT)
Dept: OBGYN | Facility: CLINIC | Age: 33
End: 2023-07-11
Payer: MEDICAID

## 2023-07-11 DIAGNOSIS — O35.BXX0 FETAL CARDIAC ANOMALY COMPLICATING PREGNANCY, ANTEPARTUM, NOT APPLICABLE OR UNSPECIFIED FETUS: ICD-10-CM

## 2023-07-11 PROCEDURE — 76817 TRANSVAGINAL US OBSTETRIC: CPT | Performed by: OBSTETRICS & GYNECOLOGY

## 2023-07-11 PROCEDURE — 99202 OFFICE O/P NEW SF 15 MIN: CPT | Performed by: OBSTETRICS & GYNECOLOGY

## 2023-07-11 PROCEDURE — 76811 OB US DETAILED SNGL FETUS: CPT | Performed by: OBSTETRICS & GYNECOLOGY

## 2023-07-11 NOTE — PROGRESS NOTES
SMALL VSD:  A small muscular VSD was observed. Small muscular VSDs are common and the majority of small VSDs will close spontaneously. A  echocardiography is recommended. She was provided with a copy of the business card of the pediatric cardiologist and she signed a release so a copy of this report can be sent to the pediatric cardiologist.     Expanded problem focused consultation with straight forward decision making.

## 2023-12-01 ENCOUNTER — HOSPITAL ENCOUNTER (INPATIENT)
Facility: MEDICAL CENTER | Age: 33
LOS: 2 days | End: 2023-12-03
Attending: SPECIALIST | Admitting: SPECIALIST
Payer: MEDICAID

## 2023-12-01 ENCOUNTER — ANESTHESIA EVENT (OUTPATIENT)
Dept: ANESTHESIOLOGY | Facility: MEDICAL CENTER | Age: 33
End: 2023-12-01
Payer: MEDICAID

## 2023-12-01 ENCOUNTER — ANESTHESIA (OUTPATIENT)
Dept: ANESTHESIOLOGY | Facility: MEDICAL CENTER | Age: 33
End: 2023-12-01
Payer: MEDICAID

## 2023-12-01 DIAGNOSIS — R10.2 PELVIC PAIN: ICD-10-CM

## 2023-12-01 LAB
BASOPHILS # BLD AUTO: 0.3 % (ref 0–1.8)
BASOPHILS # BLD: 0.05 K/UL (ref 0–0.12)
EOSINOPHIL # BLD AUTO: 0 K/UL (ref 0–0.51)
EOSINOPHIL NFR BLD: 0 % (ref 0–6.9)
ERYTHROCYTE [DISTWIDTH] IN BLOOD BY AUTOMATED COUNT: 48 FL (ref 35.9–50)
HCT VFR BLD AUTO: 41.5 % (ref 37–47)
HGB BLD-MCNC: 14.5 G/DL (ref 12–16)
HOLDING TUBE BB 8507: NORMAL
IMM GRANULOCYTES # BLD AUTO: 0.12 K/UL (ref 0–0.11)
IMM GRANULOCYTES NFR BLD AUTO: 0.8 % (ref 0–0.9)
LYMPHOCYTES # BLD AUTO: 3.19 K/UL (ref 1–4.8)
LYMPHOCYTES NFR BLD: 20.7 % (ref 22–41)
MCH RBC QN AUTO: 32.5 PG (ref 27–33)
MCHC RBC AUTO-ENTMCNC: 34.9 G/DL (ref 32.2–35.5)
MCV RBC AUTO: 93 FL (ref 81.4–97.8)
MONOCYTES # BLD AUTO: 0.89 K/UL (ref 0–0.85)
MONOCYTES NFR BLD AUTO: 5.8 % (ref 0–13.4)
NEUTROPHILS # BLD AUTO: 11.15 K/UL (ref 1.82–7.42)
NEUTROPHILS NFR BLD: 72.4 % (ref 44–72)
NRBC # BLD AUTO: 0 K/UL
NRBC BLD-RTO: 0 /100 WBC (ref 0–0.2)
PLATELET # BLD AUTO: 195 K/UL (ref 164–446)
PMV BLD AUTO: 9.6 FL (ref 9–12.9)
RBC # BLD AUTO: 4.46 M/UL (ref 4.2–5.4)
WBC # BLD AUTO: 15.4 K/UL (ref 4.8–10.8)

## 2023-12-01 PROCEDURE — 700111 HCHG RX REV CODE 636 W/ 250 OVERRIDE (IP): Performed by: SPECIALIST

## 2023-12-01 PROCEDURE — 700105 HCHG RX REV CODE 258: Performed by: SPECIALIST

## 2023-12-01 PROCEDURE — 59409 OBSTETRICAL CARE: CPT

## 2023-12-01 PROCEDURE — 302449 STATCHG TRIAGE ONLY (STATISTIC)

## 2023-12-01 PROCEDURE — 700105 HCHG RX REV CODE 258: Performed by: STUDENT IN AN ORGANIZED HEALTH CARE EDUCATION/TRAINING PROGRAM

## 2023-12-01 PROCEDURE — 86780 TREPONEMA PALLIDUM: CPT

## 2023-12-01 PROCEDURE — 700101 HCHG RX REV CODE 250: Performed by: STUDENT IN AN ORGANIZED HEALTH CARE EDUCATION/TRAINING PROGRAM

## 2023-12-01 PROCEDURE — 303615 HCHG EPIDURAL/SPINAL ANESTHESIA FOR LABOR

## 2023-12-01 PROCEDURE — 700111 HCHG RX REV CODE 636 W/ 250 OVERRIDE (IP): Mod: JZ

## 2023-12-01 PROCEDURE — 304965 HCHG RECOVERY SERVICES

## 2023-12-01 PROCEDURE — 36415 COLL VENOUS BLD VENIPUNCTURE: CPT

## 2023-12-01 PROCEDURE — 85025 COMPLETE CBC W/AUTO DIFF WBC: CPT

## 2023-12-01 PROCEDURE — 700111 HCHG RX REV CODE 636 W/ 250 OVERRIDE (IP): Performed by: STUDENT IN AN ORGANIZED HEALTH CARE EDUCATION/TRAINING PROGRAM

## 2023-12-01 PROCEDURE — 770002 HCHG ROOM/CARE - OB PRIVATE (112)

## 2023-12-01 PROCEDURE — 10907ZC DRAINAGE OF AMNIOTIC FLUID, THERAPEUTIC FROM PRODUCTS OF CONCEPTION, VIA NATURAL OR ARTIFICIAL OPENING: ICD-10-PCS | Performed by: SPECIALIST

## 2023-12-01 RX ORDER — LIDOCAINE HYDROCHLORIDE AND EPINEPHRINE 15; 5 MG/ML; UG/ML
INJECTION, SOLUTION EPIDURAL
Status: COMPLETED | OUTPATIENT
Start: 2023-12-01 | End: 2023-12-01

## 2023-12-01 RX ORDER — TERBUTALINE SULFATE 1 MG/ML
0.25 INJECTION, SOLUTION SUBCUTANEOUS
Status: DISCONTINUED | OUTPATIENT
Start: 2023-12-01 | End: 2023-12-02 | Stop reason: HOSPADM

## 2023-12-01 RX ORDER — ROPIVACAINE HYDROCHLORIDE 2 MG/ML
INJECTION, SOLUTION EPIDURAL; INFILTRATION; PERINEURAL CONTINUOUS
Status: DISCONTINUED | OUTPATIENT
Start: 2023-12-01 | End: 2023-12-02

## 2023-12-01 RX ORDER — SODIUM CHLORIDE, SODIUM LACTATE, POTASSIUM CHLORIDE, AND CALCIUM CHLORIDE .6; .31; .03; .02 G/100ML; G/100ML; G/100ML; G/100ML
1000 INJECTION, SOLUTION INTRAVENOUS
Status: COMPLETED | OUTPATIENT
Start: 2023-12-01 | End: 2023-12-01

## 2023-12-01 RX ORDER — SODIUM CHLORIDE, SODIUM LACTATE, POTASSIUM CHLORIDE, AND CALCIUM CHLORIDE .6; .31; .03; .02 G/100ML; G/100ML; G/100ML; G/100ML
250 INJECTION, SOLUTION INTRAVENOUS PRN
Status: DISCONTINUED | OUTPATIENT
Start: 2023-12-01 | End: 2023-12-02

## 2023-12-01 RX ORDER — SODIUM CHLORIDE, SODIUM LACTATE, POTASSIUM CHLORIDE, CALCIUM CHLORIDE 600; 310; 30; 20 MG/100ML; MG/100ML; MG/100ML; MG/100ML
INJECTION, SOLUTION INTRAVENOUS CONTINUOUS
Status: DISCONTINUED | OUTPATIENT
Start: 2023-12-01 | End: 2023-12-02

## 2023-12-01 RX ORDER — OXYTOCIN 10 [USP'U]/ML
10 INJECTION, SOLUTION INTRAMUSCULAR; INTRAVENOUS
Status: DISCONTINUED | OUTPATIENT
Start: 2023-12-01 | End: 2023-12-02 | Stop reason: HOSPADM

## 2023-12-01 RX ORDER — ROPIVACAINE HYDROCHLORIDE 2 MG/ML
INJECTION, SOLUTION EPIDURAL; INFILTRATION PRN
Status: DISCONTINUED | OUTPATIENT
Start: 2023-12-01 | End: 2023-12-01 | Stop reason: SURG

## 2023-12-01 RX ORDER — LIDOCAINE HYDROCHLORIDE 10 MG/ML
20 INJECTION, SOLUTION INFILTRATION; PERINEURAL
Status: DISCONTINUED | OUTPATIENT
Start: 2023-12-01 | End: 2023-12-02 | Stop reason: HOSPADM

## 2023-12-01 RX ORDER — EPHEDRINE SULFATE 50 MG/ML
5 INJECTION, SOLUTION INTRAVENOUS
Status: DISCONTINUED | OUTPATIENT
Start: 2023-12-01 | End: 2023-12-02

## 2023-12-01 RX ORDER — ROPIVACAINE HYDROCHLORIDE 2 MG/ML
INJECTION, SOLUTION EPIDURAL; INFILTRATION; PERINEURAL
Status: COMPLETED
Start: 2023-12-01 | End: 2023-12-01

## 2023-12-01 RX ADMIN — SODIUM CHLORIDE, POTASSIUM CHLORIDE, SODIUM LACTATE AND CALCIUM CHLORIDE 1000 ML: 600; 310; 30; 20 INJECTION, SOLUTION INTRAVENOUS at 20:36

## 2023-12-01 RX ADMIN — LIDOCAINE HYDROCHLORIDE,EPINEPHRINE BITARTRATE 1 ML: 15; .005 INJECTION, SOLUTION EPIDURAL; INFILTRATION; INTRACAUDAL; PERINEURAL at 20:18

## 2023-12-01 RX ADMIN — OXYTOCIN 125 ML/HR: 10 INJECTION, SOLUTION INTRAMUSCULAR; INTRAVENOUS at 22:37

## 2023-12-01 RX ADMIN — ROPIVACAINE HYDROCHLORIDE 10 ML: 5 INJECTION, SOLUTION EPIDURAL; INFILTRATION; PERINEURAL at 20:18

## 2023-12-01 RX ADMIN — ROPIVACAINE HYDROCHLORIDE 200 MG: 2 INJECTION, SOLUTION EPIDURAL; INFILTRATION; PERINEURAL at 20:13

## 2023-12-01 RX ADMIN — ROPIVACAINE HYDROCHLORIDE 200 MG: 2 INJECTION, SOLUTION EPIDURAL; INFILTRATION at 20:13

## 2023-12-01 RX ADMIN — LIDOCAINE HYDROCHLORIDE,EPINEPHRINE BITARTRATE 3 ML: 15; .005 INJECTION, SOLUTION EPIDURAL; INFILTRATION; INTRACAUDAL; PERINEURAL at 20:13

## 2023-12-01 RX ADMIN — SODIUM CHLORIDE, POTASSIUM CHLORIDE, SODIUM LACTATE AND CALCIUM CHLORIDE: 600; 310; 30; 20 INJECTION, SOLUTION INTRAVENOUS at 20:35

## 2023-12-01 RX ADMIN — OXYTOCIN 20 UNITS: 10 INJECTION, SOLUTION INTRAMUSCULAR; INTRAVENOUS at 21:10

## 2023-12-01 ASSESSMENT — FIBROSIS 4 INDEX: FIB4 SCORE: 0.73

## 2023-12-02 LAB
ERYTHROCYTE [DISTWIDTH] IN BLOOD BY AUTOMATED COUNT: 51.6 FL (ref 35.9–50)
HCT VFR BLD AUTO: 35.3 % (ref 37–47)
HGB BLD-MCNC: 11.8 G/DL (ref 12–16)
MCH RBC QN AUTO: 32.9 PG (ref 27–33)
MCHC RBC AUTO-ENTMCNC: 33.4 G/DL (ref 32.2–35.5)
MCV RBC AUTO: 98.3 FL (ref 81.4–97.8)
PLATELET # BLD AUTO: 155 K/UL (ref 164–446)
PMV BLD AUTO: 9.7 FL (ref 9–12.9)
RBC # BLD AUTO: 3.59 M/UL (ref 4.2–5.4)
T PALLIDUM AB SER QL IA: NORMAL
WBC # BLD AUTO: 13.6 K/UL (ref 4.8–10.8)

## 2023-12-02 PROCEDURE — A9270 NON-COVERED ITEM OR SERVICE: HCPCS | Performed by: SPECIALIST

## 2023-12-02 PROCEDURE — RXMED WILLOW AMBULATORY MEDICATION CHARGE: Performed by: SPECIALIST

## 2023-12-02 PROCEDURE — 700102 HCHG RX REV CODE 250 W/ 637 OVERRIDE(OP): Performed by: SPECIALIST

## 2023-12-02 PROCEDURE — 770002 HCHG ROOM/CARE - OB PRIVATE (112)

## 2023-12-02 PROCEDURE — 85027 COMPLETE CBC AUTOMATED: CPT

## 2023-12-02 PROCEDURE — 36415 COLL VENOUS BLD VENIPUNCTURE: CPT

## 2023-12-02 RX ORDER — DOCUSATE SODIUM 100 MG/1
100 CAPSULE, LIQUID FILLED ORAL 2 TIMES DAILY PRN
Status: DISCONTINUED | OUTPATIENT
Start: 2023-12-02 | End: 2023-12-03 | Stop reason: HOSPADM

## 2023-12-02 RX ORDER — SODIUM CHLORIDE, SODIUM LACTATE, POTASSIUM CHLORIDE, CALCIUM CHLORIDE 600; 310; 30; 20 MG/100ML; MG/100ML; MG/100ML; MG/100ML
INJECTION, SOLUTION INTRAVENOUS PRN
Status: DISCONTINUED | OUTPATIENT
Start: 2023-12-02 | End: 2023-12-03 | Stop reason: HOSPADM

## 2023-12-02 RX ORDER — CARBOPROST TROMETHAMINE 250 UG/ML
250 INJECTION, SOLUTION INTRAMUSCULAR
Status: DISCONTINUED | OUTPATIENT
Start: 2023-12-02 | End: 2023-12-03 | Stop reason: HOSPADM

## 2023-12-02 RX ORDER — VITAMIN A ACETATE, BETA CAROTENE, ASCORBIC ACID, CHOLECALCIFEROL, .ALPHA.-TOCOPHEROL ACETATE, DL-, THIAMINE MONONITRATE, RIBOFLAVIN, NIACINAMIDE, PYRIDOXINE HYDROCHLORIDE, FOLIC ACID, CYANOCOBALAMIN, CALCIUM CARBONATE, FERROUS FUMARATE, ZINC OXIDE, CUPRIC OXIDE 3080; 12; 120; 400; 1; 1.84; 3; 20; 22; 920; 25; 200; 27; 10; 2 [IU]/1; UG/1; MG/1; [IU]/1; MG/1; MG/1; MG/1; MG/1; MG/1; [IU]/1; MG/1; MG/1; MG/1; MG/1; MG/1
1 TABLET, FILM COATED ORAL
Status: DISCONTINUED | OUTPATIENT
Start: 2023-12-02 | End: 2023-12-03 | Stop reason: HOSPADM

## 2023-12-02 RX ORDER — IBUPROFEN 800 MG/1
800 TABLET ORAL EVERY 8 HOURS PRN
Qty: 30 TABLET | Refills: 0 | Status: SHIPPED | OUTPATIENT
Start: 2023-12-02

## 2023-12-02 RX ORDER — OXYCODONE HYDROCHLORIDE 5 MG/1
5 TABLET ORAL EVERY 4 HOURS PRN
Status: DISCONTINUED | OUTPATIENT
Start: 2023-12-02 | End: 2023-12-03 | Stop reason: HOSPADM

## 2023-12-02 RX ORDER — METHYLERGONOVINE MALEATE 0.2 MG/ML
0.2 INJECTION INTRAVENOUS
Status: DISCONTINUED | OUTPATIENT
Start: 2023-12-02 | End: 2023-12-03 | Stop reason: HOSPADM

## 2023-12-02 RX ORDER — SIMETHICONE 125 MG
125 TABLET,CHEWABLE ORAL 4 TIMES DAILY PRN
Status: DISCONTINUED | OUTPATIENT
Start: 2023-12-02 | End: 2023-12-03 | Stop reason: HOSPADM

## 2023-12-02 RX ORDER — OXYCODONE HYDROCHLORIDE AND ACETAMINOPHEN 5; 325 MG/1; MG/1
1 TABLET ORAL EVERY 6 HOURS PRN
Qty: 28 TABLET | Refills: 0 | Status: SHIPPED | OUTPATIENT
Start: 2023-12-02 | End: 2023-12-10

## 2023-12-02 RX ORDER — IBUPROFEN 800 MG/1
800 TABLET ORAL EVERY 8 HOURS PRN
Status: DISCONTINUED | OUTPATIENT
Start: 2023-12-02 | End: 2023-12-03 | Stop reason: HOSPADM

## 2023-12-02 RX ORDER — CALCIUM CARBONATE 500 MG/1
1000 TABLET, CHEWABLE ORAL EVERY 6 HOURS PRN
Status: DISCONTINUED | OUTPATIENT
Start: 2023-12-02 | End: 2023-12-03 | Stop reason: HOSPADM

## 2023-12-02 RX ORDER — MISOPROSTOL 200 UG/1
600 TABLET ORAL
Status: DISCONTINUED | OUTPATIENT
Start: 2023-12-02 | End: 2023-12-03 | Stop reason: HOSPADM

## 2023-12-02 RX ORDER — ACETAMINOPHEN 500 MG
1000 TABLET ORAL EVERY 6 HOURS PRN
Status: DISCONTINUED | OUTPATIENT
Start: 2023-12-02 | End: 2023-12-03 | Stop reason: HOSPADM

## 2023-12-02 RX ADMIN — OXYCODONE 5 MG: 5 TABLET ORAL at 01:13

## 2023-12-02 RX ADMIN — OXYCODONE 5 MG: 5 TABLET ORAL at 06:46

## 2023-12-02 RX ADMIN — IBUPROFEN 800 MG: 800 TABLET, FILM COATED ORAL at 22:56

## 2023-12-02 RX ADMIN — OXYCODONE 5 MG: 5 TABLET ORAL at 22:56

## 2023-12-02 RX ADMIN — IBUPROFEN 800 MG: 800 TABLET, FILM COATED ORAL at 14:35

## 2023-12-02 RX ADMIN — ACETAMINOPHEN 1000 MG: 500 TABLET ORAL at 10:24

## 2023-12-02 RX ADMIN — OXYCODONE 5 MG: 5 TABLET ORAL at 10:23

## 2023-12-02 RX ADMIN — IBUPROFEN 800 MG: 800 TABLET, FILM COATED ORAL at 06:46

## 2023-12-02 RX ADMIN — OXYCODONE 5 MG: 5 TABLET ORAL at 18:07

## 2023-12-02 RX ADMIN — OXYCODONE 5 MG: 5 TABLET ORAL at 14:36

## 2023-12-02 RX ADMIN — ACETAMINOPHEN 1000 MG: 500 TABLET ORAL at 16:54

## 2023-12-02 RX ADMIN — PRENATAL WITH FERROUS FUM AND FOLIC ACID 1 TABLET: 3080; 920; 120; 400; 22; 1.84; 3; 20; 10; 1; 12; 200; 27; 25; 2 TABLET ORAL at 08:05

## 2023-12-02 RX ADMIN — ACETAMINOPHEN 1000 MG: 500 TABLET ORAL at 01:14

## 2023-12-02 ASSESSMENT — EDINBURGH POSTNATAL DEPRESSION SCALE (EPDS)
I HAVE BEEN SO UNHAPPY THAT I HAVE HAD DIFFICULTY SLEEPING: NOT AT ALL
I HAVE LOOKED FORWARD WITH ENJOYMENT TO THINGS: AS MUCH AS I EVER DID
I HAVE BEEN SO UNHAPPY THAT I HAVE BEEN CRYING: NO, NEVER
I HAVE FELT SAD OR MISERABLE: NO, NOT AT ALL
THE THOUGHT OF HARMING MYSELF HAS OCCURRED TO ME: NEVER
I HAVE BEEN ANXIOUS OR WORRIED FOR NO GOOD REASON: YES, SOMETIMES
I HAVE BEEN ABLE TO LAUGH AND SEE THE FUNNY SIDE OF THINGS: AS MUCH AS I ALWAYS COULD
I HAVE BLAMED MYSELF UNNECESSARILY WHEN THINGS WENT WRONG: YES, SOME OF THE TIME
THINGS HAVE BEEN GETTING ON TOP OF ME: NO, MOST OF THE TIME I HAVE COPED QUITE WELL
I HAVE FELT SCARED OR PANICKY FOR NO GOOD REASON: NO, NOT AT ALL

## 2023-12-02 ASSESSMENT — PAIN DESCRIPTION - PAIN TYPE
TYPE: ACUTE PAIN

## 2023-12-02 NOTE — ANESTHESIA POSTPROCEDURE EVALUATION
Patient: Osiris Oates    Procedure Summary       Date: 12/01/23 Room / Location:     Anesthesia Start: 2007 Anesthesia Stop: 2108    Procedure: Labor Epidural Diagnosis:     Scheduled Providers:  Responsible Provider: Quique Sanford D.O.    Anesthesia Type: epidural ASA Status: 2            Final Anesthesia Type: epidural  Last vitals  BP   Blood Pressure: 120/70    Temp   36.7 °C (98 °F)    Pulse   (!) 108   Resp   18    SpO2   97 %      Anesthesia Post Evaluation    Patient location during evaluation: floor  Patient participation: complete - patient participated  Level of consciousness: awake and alert    Airway patency: patent  Anesthetic complications: no  Cardiovascular status: hemodynamically stable  Respiratory status: acceptable  Hydration status: euvolemic    PONV: none          No notable events documented.     Nurse Pain Score: 7 (NPRS)

## 2023-12-02 NOTE — ADDENDUM NOTE
Addendum  created 12/02/23 1223 by Quique Sanford D.O.    Review and Sign - Ready for Procedure

## 2023-12-02 NOTE — ANESTHESIA PROCEDURE NOTES
Epidural Block    Date/Time: 12/1/2023 8:13 PM    Performed by: Quique Sanford D.O.  Authorized by: Quique Sanford D.O.    Patient Location:  OB  Start Time:  12/1/2023 8:13 PM  Reason for Block: labor analgesia    patient identified, IV checked, site marked, risks and benefits discussed, surgical consent, monitors and equipment checked, pre-op evaluation and timeout performed    Patient Position:  Sitting  Prep: ChloraPrep, patient draped and sterile technique    Monitoring:  Blood pressure, continuous pulse oximetry and heart rate  Approach:  Midline  Location:  L3-L4  Injection Technique:  APRIL saline  Skin infiltration:  Lidocaine  Strength:  1%  Dose:  3ml  Needle Type:  Tuohy  Needle Gauge:  17 G  Needle Length:  3.5 in  Loss of resistance::  6  Catheter Size:  19 G  Catheter at Skin Depth:  12  Test Dose Result:  Negative

## 2023-12-02 NOTE — ANESTHESIA PREPROCEDURE EVALUATION
Date: 23  Procedure: Labor Epidural       Anes H&P:  PAST MEDICAL HISTORY:   33 y.o. female who presents for labor epidural.  She has current and past medical problems significant for:    Past Medical History:   Diagnosis Date    BELLE positive     Anxiety     Endometriosis     Endometriosis     Endometriosis     Gastroparesis     Interstitial cystitis     Seizure disorder (HCC)        SMOKING/ALCOHOL/RECREATIONAL DRUG USE:  Social History     Tobacco Use    Smoking status: Former     Current packs/day: 0.00     Types: Cigarettes     Start date: 2011     Quit date: 2014     Years since quittin.2    Smokeless tobacco: Never   Vaping Use    Vaping Use: Never used   Substance Use Topics    Alcohol use: No    Drug use: Yes     Comment: marijuana     Social History     Substance and Sexual Activity   Drug Use Yes    Comment: marijuana       PAST SURGICAL HISTORY:  Past Surgical History:   Procedure Laterality Date    APPENDECTOMY      LAPAROSCOPIC DESTRUCTION OF ENDOMETRIOSIS      OTHER ABDOMINAL SURGERY      TONSILLECTOMY         ALLERGIES:   Allergies   Allergen Reactions    Tramadol Anaphylaxis     Per pt she took with Vicodin unsure which med caused the anaphylaxis     Vicodin [Hydrocodone-Acetaminophen] Anaphylaxis     Per pt she took with Tramadol unsure which med caused the anaphylaxis (can take tylenol); tolerated Percocet 3/27/21    Codeine Nausea     Gives nausea       MEDICATIONS:  No current facility-administered medications on file prior to encounter.     Current Outpatient Medications on File Prior to Encounter   Medication Sig Dispense Refill    ibuprofen (MOTRIN) 800 MG Tab Take 1 Tablet by mouth every 8 hours as needed for Mild Pain or Moderate Pain. 30 Tablet 0    ondansetron (ZOFRAN ODT) 4 MG TABLET DISPERSIBLE Take 1 Tablet by mouth every four hours as needed. 10 Tablet 0    Prenatal MV-Min-Fe Fum-FA-DHA (PRENATAL 1 PO) Take 1 tablet by mouth every day.      acetaminophen  (TYLENOL) 500 MG Tab Take 1,000 mg by mouth one time. 2 caps = 1000 mg         LABS:  Lab Results   Component Value Date/Time    HEMOGLOBIN 14.5 12/01/2023 1950    HEMATOCRIT 41.5 12/01/2023 1950    WBC 15.4 (H) 12/01/2023 1950     Lab Results   Component Value Date/Time    SODIUM 136 07/19/2023 0758    POTASSIUM 3.9 07/19/2023 0758    CHLORIDE 108 07/19/2023 0758    CO2 24 07/19/2023 0758    GLUCOSE 76 07/19/2023 0758    BUN 11 07/19/2023 0758    CALCIUM 8.9 07/19/2023 0758         PREVIOUS ANESTHETICS: See EMR  __________________________________________    Relevant Problems   No relevant active problems       Physical Exam    Airway   Mallampati: II  TM distance: >3 FB  Neck ROM: full       Cardiovascular - normal exam  Rhythm: regular  Rate: normal  (-) murmur     Dental - normal exam           Pulmonary - normal exam  Breath sounds clear to auscultation     Abdominal    Neurological - normal exam                   Anesthesia Plan    ASA 2       Plan - epidural   Neuraxial block will be labor analgesia                  Pertinent diagnostic labs and testing reviewed    Informed Consent:    Anesthetic plan and risks discussed with patient.

## 2023-12-02 NOTE — L&D DELIVERY NOTE
Normal spontaneous vaginal delivery at 21:08 (at 39 weeks and 3 days gestation).  Live term female  with Apgar scores of 8 and 9 at one and fiv minutes respectively and a  weight which has not yet kraig measured but which is estimated to be approximately 3.8 kg.  Baby was delivered over an intact perineum under continuous epidural anesthesia.  The placenta was simply delivered and examined and found to be complete.  Next examination of the vulva and vaginal mucosa revealed perhaps a tiny abrasion but no lacerations.  Then, a bimanual vaginal exam was performed and revealed that the uterus was firm and that there were no gauze sponges in the vagina.  Lap count was found to be correct.  Estimated blood loss was approximately 100 cc.  Tl Howard MD

## 2023-12-02 NOTE — L&D DELIVERY NOTE
DATE OF SERVICE:  2023     The patient is a very pleasant 33-year-old multipara (on admission, para 2 with 2 previous vaginal deliveries) who is today 39 weeks and 3 days gestation.  She has had prenatal care with myself during the course of this pregnancy.  She has also been seen during the course of this pregnancy by Dr. Garcia.  Other than for VSD seen an OB ultrasound, there have been no problems or complications with this pregnancy.  She began experiencing frequent and painful uterine contractions this morning at about 3:00 this morning and she came to labor and delivery.  She arrived in labor and delivery this evening and on presentation to labor and delivery, her cervix was found to be 8 cm dilated.  She was admitted and an IV was started and she was given a labor epidural and her labor epidural functioned well.  I then checked her cervix at about 8:55 p.m. and found her cervix to be completely dilated and a bulging bag of water was noted and I did at that time perform artificial rupture of membranes and clear amniotic fluid was observed.  She then began pushing with her contractions and did not have to push for very long before delivering.  It was at 8 minutes after 9:00 this evening, Friday, 2023, that she went on to have a normal spontaneous vaginal delivery, at 39 weeks and 3 days' gestation, and was delivered of a live term female  with Apgar scores of 8 and 9 at 1 and 5 minutes respectively and a  weight, which has not yet been measured, but which I estimate to be approximately 3.8 kg.  Baby was delivered over an intact perineum under continuous epidural anesthesia.  The placenta was simply delivered and examined and found to be complete.  Next, examination of the vulva and vaginal mucosa revealed a tiny abrasion and no lacerations.  Bimanual vaginal exam was then performed and revealed that the uterus was firm and that there were no gauze sponges in the vagina.  Lap count was  found to be correct.  The estimated blood loss was approximately 100 mL.    Addendum: The  weight was 3,490 grams.        ______________________________  MD BENITO Sykes/MABEL/SIENNA    DD:  2023 21:33  DT:  2023 22:30    Job#:  [id number]

## 2023-12-02 NOTE — PROGRESS NOTES
1948: Report received from Graciela SMALLWOOD. Plan of care discussed, care assumed at this time.   1952: Patient requesting an epidural at this time. Claribel CASANOVA notified.  2010: Claribel CASANOVA at bedside for epidural placement. Time out completed; all agree.  2055: Lee CASANOVA at bedside. AROM to clear; SVE 10/100/+1.   2108: Delivery of viable female infant; 8/9 APGARs. Infant placed skin to skin with mother.

## 2023-12-02 NOTE — PROGRESS NOTES
Osiris is today postpartum day #1 status post term vaginal delivery.  She tells me this morning that other than for some soreness she feels fine and has no other problems or complaints.  She says that her vaginal bleeding is minimal.  She says she would be interested in going home later today if baby is allowed to be discharged home today.  Vital signs: The patient's vital signs are stable and she is afebrile.  The patient's temperature this morning was 36.7 °C (98 °F) and her heart rate this morning was 108 bpm and her respiratory rate this morning was 18 breaths/min and her pulse oximetry this morning was 97% on room air and her blood pressure this morning was 120/70.  General: The patient appears well-developed and well-nourished and relaxed and alert and comfortable and in no apparent distress.  Labs: Pending.  Assessment:  Postpartum day #1 status post term vaginal delivery and the patient appears to be doing well.  Plan:  I explained to the patient that we could discharge her home later today and that I would discharge her home with prescriptions for Percocet and ibuprofen and I explained to her that if the pediatricians would like to keep baby until tomorrow then we will cancel her discharge orders and keep her until tomorrow and she acknowledged this.  I explained to her that would like to have her return to the office in 6 weeks for postpartum visit and I asked her to call or contact me at any time should she ever have any problems or questions or complaints and she said that she would do so.  Tl Howard MD

## 2023-12-02 NOTE — ANESTHESIA TIME REPORT
Anesthesia Start and Stop Event Times       Date Time Event    12/1/2023 2007 Anesthesia Start     2108 Anesthesia Stop          Responsible Staff  12/01/23      Name Role Begin End    ROCIO AndreO. Anesth 2007 2108          Overtime Reason:  no overtime (within assigned shift)    Comments:

## 2023-12-02 NOTE — LACTATION NOTE
This note was copied from a baby's chart.  Initial LC visit, mother reports she is breastfeeding independently without difficulty or discomfort, offered LC assistance and mother declines. She breast fed both of her older children for 2 years each  and she denies any lactation needs. Provided list of outpatient lactation resources. Parents deny questions/concerns.

## 2023-12-02 NOTE — PROGRESS NOTES
The patient is a very pleasant 33-year-old multipart (para 2 with 2 previous vaginal deliveries) who is today 39 weeks and 3 days gestation.  She has had prenatal care with myself during the course of this pregnancy.  She has also been seen by perinatologist Dr. Garcia during the course of this pregnancy. Other than for a VSD seen on OB ultrasound there have been no problems or complications with this pregnancy. She began experiencing uterine contractions this afternoon at about 3 o'clock this afternoon and so she came in to Labor & Delivery. On presentation to Labor & Delivery this evening her cervix was found to be 8 cm dilated. She was admitted and she received a labor epidural and her labor epidural is functioning well.  The fetal heart tracing is category 1.  We will recheck her cervix and perform artificial rupture membranes and anticipate an obstetrical delivery.  Tl Howard MD

## 2023-12-02 NOTE — PROGRESS NOTES
PT arrived to room 325 with FOB and infant.  L&D RN advised patient has not voided d/t not regaining strength back in legs post epidural.  Mx assist to help pt back to bed.  POC discussed .  CAll light within reach.  Will continue to monitor

## 2023-12-03 ENCOUNTER — PHARMACY VISIT (OUTPATIENT)
Dept: PHARMACY | Facility: MEDICAL CENTER | Age: 33
End: 2023-12-03
Payer: COMMERCIAL

## 2023-12-03 VITALS
RESPIRATION RATE: 18 BRPM | TEMPERATURE: 98 F | DIASTOLIC BLOOD PRESSURE: 68 MMHG | HEART RATE: 100 BPM | SYSTOLIC BLOOD PRESSURE: 105 MMHG | BODY MASS INDEX: 31.65 KG/M2 | OXYGEN SATURATION: 97 % | HEIGHT: 62 IN | WEIGHT: 172 LBS

## 2023-12-03 PROCEDURE — 700102 HCHG RX REV CODE 250 W/ 637 OVERRIDE(OP): Performed by: SPECIALIST

## 2023-12-03 PROCEDURE — A9270 NON-COVERED ITEM OR SERVICE: HCPCS | Performed by: SPECIALIST

## 2023-12-03 RX ADMIN — OXYCODONE 5 MG: 5 TABLET ORAL at 03:23

## 2023-12-03 RX ADMIN — PRENATAL WITH FERROUS FUM AND FOLIC ACID 1 TABLET: 3080; 920; 120; 400; 22; 1.84; 3; 20; 10; 1; 12; 200; 27; 25; 2 TABLET ORAL at 08:40

## 2023-12-03 RX ADMIN — IBUPROFEN 800 MG: 800 TABLET, FILM COATED ORAL at 08:40

## 2023-12-03 RX ADMIN — OXYCODONE 5 MG: 5 TABLET ORAL at 08:50

## 2023-12-03 RX ADMIN — ACETAMINOPHEN 1000 MG: 500 TABLET ORAL at 03:23

## 2023-12-03 RX ADMIN — OXYCODONE 5 MG: 5 TABLET ORAL at 13:20

## 2023-12-03 ASSESSMENT — PAIN DESCRIPTION - PAIN TYPE
TYPE: ACUTE PAIN

## 2023-12-03 NOTE — CARE PLAN
The patient is Stable - Low risk of patient condition declining or worsening    Shift Goals  Clinical Goals: VSS, pain management, BF  Patient Goals: rooming in, bonding  Family Goals: support, bonding    Progress made toward(s) clinical / shift goals:    Problem: Knowledge Deficit - L&D  Goal: Patient and family/caregivers will demonstrate understanding of plan of care, disease process/condition, diagnostic tests and medications  Outcome: Progressing     Problem: Risk for Excess Fluid Volume  Goal: Patient will demonstrate pulse, blood pressure and neurologic signs within expected ranges and without any respiratory complications  Outcome: Progressing     Problem: Knowledge Deficit - Standard  Goal: Patient and family/care givers will demonstrate understanding of plan of care, disease process/condition, diagnostic tests and medications  Outcome: Progressing     Problem: Pain - Standard  Goal: Alleviation of pain or a reduction in pain to the patient’s comfort goal  Outcome: Progressing       Patient is not progressing towards the following goals:

## 2023-12-03 NOTE — CARE PLAN
Problem: Knowledge Deficit - Postpartum  Goal: Patient will verbalize and demonstrate understanding of self and infant care  Outcome: Progressing     Problem: Psychosocial - Postpartum  Goal: Patient will verbalize and demonstrate effective bonding and parenting behavior  Outcome: Progressing   The patient is Stable - Low risk of patient condition declining or worsening    Shift Goals  Clinical Goals: Patient will remain clinically stable  Patient Goals: rooming in, bonding  Family Goals: support, bonding    Progress made toward(s) clinical / shift goals:  patient is bonding with .    Patient is not progressing towards the following goals:

## 2023-12-03 NOTE — PROGRESS NOTES
2200: Assumed care of patient, assessment completed. Fundus is firm at umbilicus with scant lochia rubra. Patient reports 7/10 pain at this time, medication given see MAR. Plan of care discussed, patient verbalized understanding.

## 2023-12-03 NOTE — L&D DELIVERY NOTE
The patient is today postpartum day #2 status post term vaginal delivery.  She was going to go home yesterday but her discharge orders were canceled.  She tells me today that she feels fine and has no problems or complaints and would like to go home today.  Vital signs: The patient's vital signs are stable and she is afebrile.  The patient's temperature this morning was 36.7 °C (98 °F) and her heart rate this morning was 100 bpm and her respiratory rate this morning was 18 breaths/min and her pulse oximetry this morning was 97% on room air and her blood pressure this morning was 105/68.  General: The patient appears well-developed and well-nourished and relaxed and alert and comfortable and in no apparent distress.  Labs: There are no new new lab results for today.  Assessment:  Postpartum day #2 status post term vaginal delivery.  The patient is doing well and would like to go home today.  Plan:  We will discharge the patient home today with prescriptions for analgesics and I asked her to follow-up with me in the office in about 6 weeks for postpartum visit and I asked her to call or contact me at any time should she ever have any problems or questions or complaints and she said that she would do so.  Tl Howard MD

## 2023-12-03 NOTE — DISCHARGE INSTRUCTIONS

## 2023-12-03 NOTE — CARE PLAN
The patient is Stable - Low risk of patient condition declining or worsening    Shift Goals  Clinical Goals: Patient will remain clinically stable  Patient Goals:   Family Goals:     Progress made toward(s) clinical / shift goals:      Problem: Pain - Standard  Goal: Alleviation of pain or a reduction in pain to the patient’s comfort goal  Outcome: Progressing     Problem: Altered Physiologic Condition  Goal: Patient physiologically stable as evidenced by normal lochia, palpable uterine involution and vitals within normal limits  Outcome: Progressing     Patient is able to verbalize pain and request for pain management. Patient is receiving PRN medication and non-pharm measures (warm packs) for management. Fundus is firm at umbilicus with scant lochia rubra.    Patient is not progressing towards the following goals:

## 2023-12-03 NOTE — LACTATION NOTE
This note was copied from a baby's chart.  Follow-up LC visit, mother reports she is breastfeeding independently without difficulty or discomfort, mother reports breasts beginning to fill with milk, offered LC assistance prior to discharge home and mother declines. Not interested in [facility] services, has list of community outpatient lactation resources. Plan to continue cue based breastfeeding at least 8 or more times each 24 hours. Parents deny questions/concerns.

## 2023-12-09 ENCOUNTER — HOSPITAL ENCOUNTER (EMERGENCY)
Facility: MEDICAL CENTER | Age: 33
End: 2023-12-09
Attending: EMERGENCY MEDICINE
Payer: MEDICAID

## 2023-12-09 ENCOUNTER — HOSPITAL ENCOUNTER (OUTPATIENT)
Dept: RADIOLOGY | Facility: MEDICAL CENTER | Age: 33
End: 2023-12-09
Payer: MEDICAID

## 2023-12-09 VITALS
TEMPERATURE: 97.6 F | RESPIRATION RATE: 21 BRPM | SYSTOLIC BLOOD PRESSURE: 117 MMHG | BODY MASS INDEX: 29.44 KG/M2 | HEART RATE: 90 BPM | WEIGHT: 160.94 LBS | DIASTOLIC BLOOD PRESSURE: 79 MMHG | OXYGEN SATURATION: 97 %

## 2023-12-09 DIAGNOSIS — R10.2 PELVIC PAIN: ICD-10-CM

## 2023-12-09 PROCEDURE — 700102 HCHG RX REV CODE 250 W/ 637 OVERRIDE(OP): Mod: UD | Performed by: EMERGENCY MEDICINE

## 2023-12-09 PROCEDURE — A9270 NON-COVERED ITEM OR SERVICE: HCPCS | Mod: UD | Performed by: EMERGENCY MEDICINE

## 2023-12-09 PROCEDURE — 99284 EMERGENCY DEPT VISIT MOD MDM: CPT

## 2023-12-09 RX ORDER — OXYCODONE AND ACETAMINOPHEN 10; 325 MG/1; MG/1
1 TABLET ORAL ONCE
Status: COMPLETED | OUTPATIENT
Start: 2023-12-09 | End: 2023-12-09

## 2023-12-09 RX ADMIN — OXYCODONE HYDROCHLORIDE AND ACETAMINOPHEN 1 TABLET: 10; 325 TABLET ORAL at 18:26

## 2023-12-09 ASSESSMENT — FIBROSIS 4 INDEX: FIB4 SCORE: 0.31

## 2023-12-09 ASSESSMENT — PAIN DESCRIPTION - PAIN TYPE
TYPE: ACUTE PAIN
TYPE: ACUTE PAIN

## 2023-12-10 NOTE — PROGRESS NOTES
The patient is a very pleasant 33-year-old multipart (para 3 with 3 previous vaginal deliveries) who is today postpartum day #8 status post term vaginal delivery.  She lives in the [place] area.  She delivered here at [facility].  She says a few days ago she began having heavier vaginal bleeding which she says is not heavy but is more than light.  She describes this bleeding is moderate.  She says that the blood is dark red.  She also complains of pelvic pain.  She denies any fevers.  She says she is tired but says she is up most of the night every night taking care of her .  And so because of the symptoms she presented to the facility in [place] and an ultrasound and blood work were performed and she was transferred here.  I was able to review the images from the transabdominal pelvic ultrasound.  These images are of good quality and in my opinion do not reveal any dispute while evidence of retained tissue within the intrauterine cavity.  Her white blood count is normal and her hemoglobin and hematocrit are normal and her vital signs are normal.  She denies any fever.  The patient does appear tired but well-developed and well-nourished and in no apparent distress.  Examination of the patient's abdomen with the patient in the dorsal supine position reveals that the uterine fundus is firm inferior to the level of the umbilicus and today's exam reveals no tenderness to palpation of the uterine corpus and no abdominal tenderness.  The patient says that a speculum exam was performed in the facility in [place] and she says that this was very painful for her.  This is not surprising given the fact that she delivered vaginally at term 8 days ago.  I explained to her that I do not feel that it is necessary to repeat this exam.  I explained to her that given the fact that I do not see any evidence of retained tissue within the uterus when reviewing the images from the transabdominal pelvic ultrasound  performed in [place] and given the fact that there is no sign of postpartum metritis (her white blood count is normal and she does not complain of fever and she is not febrile) that it is extremely unlikely that she does have postpartum metritis.  I did discuss with her today the subject of postpartum metritis.  I explained to her that she likely has subinvolution of the uterus and discussed the subject with her.  I explained to her that I do expect her symptoms to persist for the next several days if not the next few weeks but that I do not expect that her bleeding to worsen.  I explained to her that if her bleeding does worsen or if she begins having bright red blood or any other symptoms to contact me.  I explained to her that I would like to have her call my office on Monday to set up an appointment to see me sometime this week for follow-up visit and she acknowledged this.  I explained to her that I do not see any benefit at this time with any invasive procedure such as dilation and curettage and that in fact I feel that performing such a procedure will cause her symptoms to worsen and she acknowledged this.  Also I did at her request call and would rather send in another prescription for Percocet to treat her symptoms of pelvic pain which are likely representative of after pains.  Tl Howard MD

## 2023-12-10 NOTE — ED PROVIDER NOTES
ED Provider Note    Primary care provider: Jono Carbajal M.D.    CHIEF COMPLAINT  Chief Complaint   Patient presents with    Pain     Pt reports generalized pain    Sent by MD     [place] [facility] [place] of conception.    Vaginal Bleeding     Pt reports dark red vaginal bleeding without clots       Additional history obtained from: I received a call from Dr. Whitaker from [place].  He was concerned about possible retained products as seen on ultrasound at their facility.  The patient presented there with vaginal pain and vaginal bleeding.  Labs at the time were not completed.  He discussed the case with Dr. Malcolm who recommended transferring here for a repeat ultrasound as per Dr. Del Cid the quality of the ultrasound at [place] was insufficient.  Limitation to History:  Select: : None    HPI  Osiris Oates is a 33 y.o. female G3,  8 days postpartum who presents to the Emergency Department as a transfer from [facility] for possible retained products.  Patient states she has had suprapubic/pelvic pain since delivery, the pain has been constant, possibly worsening over the past week.  She also has had vaginal bleeding, using a diaper.  She has to change this out when she voids which is roughly every 4 hours.  She denies any fevers or chills.  She has had 2 previous pregnancies without any complications and has never had pain or discharge following the pregnancy.      External Record Review: Reviewed labs from the sending facility, the patient is not anemic, no leukocytosis, no thrombocytopenia.  Patient underwent uncomplicated , now  on .    REVIEW OF SYSTEMS  See HPI.     PAST MEDICAL HISTORY   has a past medical history of BELLE positive, Anxiety, Endometriosis, Endometriosis, Endometriosis, Gastroparesis, Interstitial cystitis, and Seizure disorder (HCC) (2016).    SURGICAL HISTORY   has a past surgical history that includes  tonsillectomy; laparoscopic destruction of endometriosis; other abdominal surgery; and appendectomy.    SOCIAL HISTORY  Social History     Tobacco Use    Smoking status: Former     Current packs/day: 0.00     Types: Cigarettes     Start date: 2011     Quit date: 2014     Years since quittin.2    Smokeless tobacco: Never   Vaping Use    Vaping Use: Never used   Substance Use Topics    Alcohol use: No    Drug use: Yes     Comment: marijuana      Social History     Substance and Sexual Activity   Drug Use Yes    Comment: marijuana       FAMILY HISTORY  Family History   Problem Relation Age of Onset    Other Mother         Wilsons Disease       CURRENT MEDICATIONS  Reviewed.  See Encounter Summary.     ALLERGIES  Allergies   Allergen Reactions    Tramadol Anaphylaxis     Per pt she took with Vicodin unsure which med caused the anaphylaxis     Vicodin [Hydrocodone-Acetaminophen] Anaphylaxis     Per pt she took with Tramadol unsure which med caused the anaphylaxis (can take tylenol); tolerated Percocet 3/27/21    Codeine Nausea     Gives nausea       PHYSICAL EXAM  VITAL SIGNS: /79   Pulse 90   Temp 36.4 °C (97.6 °F) (Temporal)   Resp (!) 21   Wt 73 kg (160 lb 15 oz)   SpO2 97%   BMI 29.44 kg/m²   Constitutional: Awake, alert in no apparent distress.  HENT: Normocephalic, Bilateral external ears normal. Nose normal.   Eyes: Conjunctiva normal, non-icteric, EOMI.    Thorax & Lungs: Easy unlabored respirations, Clear to ascultation bilaterally.  Cardiovascular: Regular rate, Regular rhythm, No murmurs, rubs or gallops. Bilateral pulses symmetrical.   Abdomen:  Soft, moderate suprapubic tenderness without rebound or guarding, nondistended, normal active bowel sounds.   :    Skin: Visualized skin is  Dry, No erythema, No rash.   Musculoskeletal:   No cyanosis, clubbing or edema. No leg asymmetry.   Neurologic: Alert, Grossly non-focal.   Psychiatric: Normal affect, Normal mood  Lymphatic:             COURSE & MEDICAL DECISION MAKING  Pertinent Labs & Imaging studies reviewed. (See chart for details)    COURSE & MEDICAL DECISION MAKING  Pertinent Labs & Imaging studies reviewed. (See chart for details)    Differential diagnoses include but are not limited to: Retained products versus postpartum pelvic pain.    6:08 PM - Nursing notes reviewed, patient seen and examined at bedside.      Discussion of management with other medical personnel: Case discussed with Dr. Howard who graciously evaluated the patient here in the emergency department.    Escalation of care considered, and ultimately not performed: blood analysis and diagnostic imaging.  Decision tools and prescription drugs considered including, but not limited to: Antibiotics, not indicated    Decision Making:  This is a pleasant 33 y.o. year old female who presents with persistent pelvic pain and vaginal bleeding status post .  The patient is hemodynamically stable, she has no sign of sepsis.  Abdominal examination is benign.  Labs from the sending facility benign.  I discussed the case with Dr. Howard, her OB/GYN.  He graciously evaluated the patient here in the emergency department.  He would agree, seems very unlikely that there would be retained products.  Believes that the pain is likely due to the recovery from delivery.  He will extend out her pain medications.  No indication for D&C at this time.       The patient was discharged home (see d/c instructions) was told to return immediately for any signs or symptoms listed, or any worsening at all.  The patient verbally agreed to the discharge precautions and follow-up plan which is documented in EPIC.    Discharge Medications:  Discharge Medication List as of 2023  7:12 PM          FINAL IMPRESSION  1. Pelvic pain

## 2023-12-10 NOTE — H&P
Osiris Oates  YOB: 1990  Date of admission: Friday, December 1, 2023  Facility: [facility] Labor & Delivery    ID: The patient is a very pleasant 33-year-old multipara (on admission para-2 with two previous vaginal deliveries) and 39 weeks and 3 days gestation.    Chief complaint: The patient presented with complaints of frequent and painful uterine contractions.    History of present illness:  The patient had prenatal care with my sultry the course of her pregnancy and was also seen during the course of her pregnancy by perinatologist Dr. Garcia. Other than for the finding of VSD seen an OB ultrasound there were no complications or problems with this pregnancy. She began experiencing uterine contractions in the afternoon of the day of admission, at about 3 o'clock in the afternoon, and so presented to Labor & Delivery and on presentation to Labor & Delivery her cervix was found to be 8 centimeters dilated.    Past medical history:  The patient has no medical illnesses.    Past surgical history: The patient has had no previous surgeries.    Medications:  The patient takes no medications other than for prenatal vitamins.    Allergies: The patient has no known drug allergies.    Social history: The patient denies smoking. She denies consuming alcoholic beverages. She denies using recreational drugs.    Review of Systems:    General: The patient denies any fevers or chills or sweats.   Pulmonary: The patient denies any coughing or wheezing or chest pain or shortness of breath.  Cardiovascular: The patient denies any palpitations, chest pain, dyspnea.  Gastrointestinal: The patient denies any nausea, vomiting, diarrhea, constipation, hematochezia, melena.  Genitourinary: The patient presented to Labor & Delivery with complaints of frequent and painful uterine contractions.  Musculoskeletal: The patient denies any arthralgias or myalgias other than for hip pain and low back  pain.  Neurological: The patient denies any headaches or syncope or seizures.      Physical Exam:    Vital signs: The patient's vital signs are stable and she is afebrile.  General: The patient appears well developed and well-nourished.  HEENT: Normocephalic, atraumatic, pupils equal, round, reactive to light and accommodation, extra ocular motions intact, pharynx clear.  There is no thyromegaly.  There is no cervical lymphadenopathy.  Chest: Heart regular rate and rhythm, with no murmurs or rubs or gallops.  The lungs are clear to auscultation bilaterally.  Abdomen: The abdomen is  and is about nine months size.  Digital cervical exam: Digital cervical exam on admission reveals the cervix is eight centimeters dilated and reveals a bulging bag of water.  Extremities: No clubbing or cyanosis or edema except for perhaps mild bilateral symmetrical lower extremity edema consistent with term pregnancy..  Neurological: Nonfocal.        Assessment:   1.) Intrauterine pregnancy at 39 weeks and 3 days gestation.  2.) Active labor.    Plan:   The patient was admitted and not long after admission she delivered. Please see dictated delivery notes for details regarding delivery.        __________________  Tl Howard M.D.

## 2023-12-10 NOTE — ED TRIAGE NOTES
Osiris Oates  33 y.o. female  Chief Complaint   Patient presents with    Pain     Pt reports generalized pain    Sent by MD     [place] [facility] [place].    Vaginal Bleeding     Pt reports dark red vaginal bleeding without clots       Pt amb to triage with steady gait for above complaint.   Pt is alert and oriented, speaking in full sentences, follows commands and responds appropriately to questions. Not in any apparent distress. Respirations are even and unlabored.  Pt placed in lobby. Pt educated on triage process. Pt encouraged to alert staff for any changes.